# Patient Record
Sex: FEMALE | Race: WHITE | URBAN - METROPOLITAN AREA
[De-identification: names, ages, dates, MRNs, and addresses within clinical notes are randomized per-mention and may not be internally consistent; named-entity substitution may affect disease eponyms.]

---

## 2022-03-30 ENCOUNTER — OFFICE VISIT (OUTPATIENT)
Dept: GASTROENTEROLOGY | Age: 62
End: 2022-03-30
Payer: COMMERCIAL

## 2022-03-30 VITALS — WEIGHT: 166 LBS | BODY MASS INDEX: 26.68 KG/M2 | HEIGHT: 66 IN

## 2022-03-30 DIAGNOSIS — K21.9 GASTROESOPHAGEAL REFLUX DISEASE, UNSPECIFIED WHETHER ESOPHAGITIS PRESENT: Primary | ICD-10-CM

## 2022-03-30 PROCEDURE — 99204 OFFICE O/P NEW MOD 45 MIN: CPT | Performed by: SPECIALIST

## 2022-03-30 RX ORDER — OMEPRAZOLE 20 MG/1
20 TABLET, DELAYED RELEASE ORAL DAILY
COMMUNITY

## 2022-03-30 RX ORDER — GABAPENTIN 300 MG/1
300 CAPSULE ORAL DAILY
COMMUNITY

## 2022-03-30 ASSESSMENT — ENCOUNTER SYMPTOMS
RESPIRATORY NEGATIVE: 1
GASTROINTESTINAL NEGATIVE: 1
NAUSEA: 0
BLOOD IN STOOL: 0
ANAL BLEEDING: 0
RECTAL PAIN: 0
CONSTIPATION: 0
ABDOMINAL DISTENTION: 0
VOMITING: 0
DIARRHEA: 0
EYES NEGATIVE: 1
ABDOMINAL PAIN: 0

## 2022-03-30 NOTE — PROGRESS NOTES
Gastroenterology Clinic Visit    Antoni Lyn  [id number]  Chief Complaint   Patient presents with   Esperanza Rodas     Patient complains of acid reflux, bloating and trouble swallowing over the past few years. HPI: 62 y.o. female presents to the clinic with history of regurgitation especially when patient bends over and also has episodes of hiccups and sensation of epigastric fullness after a meal.  No emesis. Had these symptoms for the last 2 years. History of occasional retrosternal burning sensation. Has been on omeprazole 20 g once a day with improvement in symptoms. Patient also experiences sensation of food sitting in the throat. No history of hematemesis or melena. History of 5 pound voluntary weight loss. Takes meloxicam as needed for arthritis. Experience chest discomfort when patient lies flat and has to elevate the head to relieve symptoms. Social history does not smoke drinks alcohol very occasionally. Surgical history includes cardiac ablation, lithotripsy for kidney stones and shoulder surgery. Family history negative for bladder redness esophagus or esophageal neoplasm. Review of Systems   Constitutional: Negative. HENT: Negative. Eyes: Negative. Respiratory: Negative. Cardiovascular: Negative. History of SVT status post ablation therapy currently cardiac status is stable   Gastrointestinal: Negative. Negative for abdominal distention, abdominal pain, anal bleeding, blood in stool, constipation, diarrhea, nausea, rectal pain and vomiting. GERD and regurgitation,   Endocrine: Negative. Genitourinary: Negative. History of kidney stones   Musculoskeletal: Positive for arthralgias. Skin: Negative. Allergic/Immunologic: Negative for food allergies. Neurological: Negative. Neuropathy   Hematological: Negative. Psychiatric/Behavioral: Negative. No past medical history on file.    No past surgical history on file.  Current Outpatient Medications on File Prior to Visit   Medication Sig Dispense Refill    gabapentin (NEURONTIN) 300 MG capsule Take 300 capsules by mouth daily.  omeprazole (PRILOSEC OTC) 20 MG tablet Take 20 mg by mouth daily      sertraline (ZOLOFT) 50 MG tablet Take 50 mg by mouth daily       No current facility-administered medications on file prior to visit. No family history on file. Social History     Socioeconomic History    Marital status:      Spouse name: None    Number of children: None    Years of education: None    Highest education level: None   Occupational History    None   Tobacco Use    Smoking status: Never Smoker    Smokeless tobacco: Never Used   Substance and Sexual Activity    Alcohol use: Never    Drug use: Never    Sexual activity: None   Other Topics Concern    None   Social History Narrative    None     Social Determinants of Health     Financial Resource Strain:     Difficulty of Paying Living Expenses: Not on file   Food Insecurity:     Worried About Running Out of Food in the Last Year: Not on file    Rose of Food in the Last Year: Not on file   Transportation Needs:     Lack of Transportation (Medical): Not on file    Lack of Transportation (Non-Medical):  Not on file   Physical Activity:     Days of Exercise per Week: Not on file    Minutes of Exercise per Session: Not on file   Stress:     Feeling of Stress : Not on file   Social Connections:     Frequency of Communication with Friends and Family: Not on file    Frequency of Social Gatherings with Friends and Family: Not on file    Attends Congregation Services: Not on file    Active Member of Clubs or Organizations: Not on file    Attends Club or Organization Meetings: Not on file    Marital Status: Not on file   Intimate Partner Violence:     Fear of Current or Ex-Partner: Not on file    Emotionally Abused: Not on file    Physically Abused: Not on file    Sexually Abused: Not on file   Housing Stability:     Unable to Pay for Housing in the Last Year: Not on file    Number of Places Lived in the Last Year: Not on file    Unstable Housing in the Last Year: Not on file       Height 5' 6\" (1.676 m), weight 166 lb (75.3 kg). Physical Exam  Constitutional:       Appearance: She is well-developed. HENT:      Head: Normocephalic and atraumatic. Eyes:      Conjunctiva/sclera: Conjunctivae normal.      Pupils: Pupils are equal, round, and reactive to light. Cardiovascular:      Rate and Rhythm: Normal rate. Pulmonary:      Effort: Pulmonary effort is normal.   Abdominal:      General: Bowel sounds are normal.      Palpations: Abdomen is soft. Comments: Soft nontender no palpable mass   Musculoskeletal:         General: Normal range of motion. Cervical back: Normal range of motion. Skin:     General: Skin is warm. Neurological:      Mental Status: She is alert. Laboratory, Pathology, Radiology reviewed indetail with relevant important investigations summarized below:  No results found for: WBC, HGB, HCT, MCV, PLT  No results found for: ALT, AST, GGT, ALKPHOS, BILITOT    No results found. Endoscopic investigations:     Assessmentand Plan:  62 y.o. female with history of GERD, postprandial bloating and occasional supra esophageal symptoms. Has been on a PPI with some response. Possible gastroparesis. Will schedule EGD. Diagnosis Orders   1. Gastroesophageal reflux disease, unspecified whether esophagitis present  EGD     No follow-ups on file. Julieta Garcia MD   Staff Gastroenterologist  [facility]    Please note this report has been partially produced using speech recognition software and may cause contain errors related to thatsystem including grammar, punctuation and spelling as well as words and phrases that may seem inappropriate. If there are questions or concerns please feel free to contact me to clarify.

## 2022-05-03 LAB
SARS-COV-2, PCR: NOT DETECTED
SPECIMEN SOURCE: NORMAL

## 2022-05-13 LAB — SURGICAL PATHOLOGY REPORT: NORMAL

## 2022-05-13 RX ORDER — CLARITHROMYCIN 500 MG/1
500 TABLET, COATED ORAL 2 TIMES DAILY
Qty: 28 TABLET | Refills: 0 | Status: SHIPPED | OUTPATIENT
Start: 2022-05-13 | End: 2022-05-27

## 2022-05-13 RX ORDER — AMOXICILLIN 500 MG/1
1000 CAPSULE ORAL 2 TIMES DAILY
Qty: 56 CAPSULE | Refills: 0 | Status: SHIPPED | OUTPATIENT
Start: 2022-05-13 | End: 2022-05-27

## 2022-05-13 RX ORDER — PANTOPRAZOLE SODIUM 40 MG/1
40 TABLET, DELAYED RELEASE ORAL
Qty: 28 TABLET | Refills: 0 | Status: SHIPPED | OUTPATIENT
Start: 2022-05-13 | End: 2022-05-27

## 2023-03-29 DIAGNOSIS — F41.9 ANXIETY: ICD-10-CM

## 2023-03-29 RX ORDER — SERTRALINE HYDROCHLORIDE 50 MG/1
50 TABLET, FILM COATED ORAL DAILY
Qty: 90 TABLET | Refills: 3 | Status: SHIPPED | OUTPATIENT
Start: 2023-03-29

## 2023-03-29 RX ORDER — SERTRALINE HYDROCHLORIDE 50 MG/1
1 TABLET, FILM COATED ORAL DAILY
COMMUNITY
Start: 2019-04-18 | End: 2023-03-29 | Stop reason: SDUPTHER

## 2023-04-11 RX ORDER — CALCIUM CARBONATE/VITAMIN D3 500 MG-10
TABLET,CHEWABLE ORAL
COMMUNITY
End: 2023-12-12 | Stop reason: WASHOUT

## 2023-04-11 RX ORDER — GABAPENTIN 300 MG/1
1 CAPSULE ORAL DAILY
COMMUNITY

## 2023-04-11 RX ORDER — METHYLPREDNISOLONE 4 MG/1
TABLET ORAL
COMMUNITY
Start: 2022-10-24 | End: 2023-04-13 | Stop reason: ALTCHOICE

## 2023-04-11 RX ORDER — NALOXONE HYDROCHLORIDE 4 MG/.1ML
SPRAY NASAL
COMMUNITY
Start: 2023-01-02 | End: 2023-04-13 | Stop reason: ALTCHOICE

## 2023-04-11 RX ORDER — CHOLECALCIFEROL (VITAMIN D3) 25 MCG
1 TABLET ORAL DAILY
COMMUNITY

## 2023-04-11 RX ORDER — PANTOPRAZOLE SODIUM 40 MG/1
1 TABLET, DELAYED RELEASE ORAL DAILY
COMMUNITY
Start: 2022-05-13 | End: 2023-04-13 | Stop reason: ALTCHOICE

## 2023-04-11 RX ORDER — MELOXICAM 15 MG/1
1 TABLET ORAL DAILY PRN
COMMUNITY
Start: 2015-07-08

## 2023-04-11 RX ORDER — AZITHROMYCIN 250 MG/1
TABLET, FILM COATED ORAL
COMMUNITY
Start: 2023-03-31 | End: 2023-04-13 | Stop reason: ALTCHOICE

## 2023-04-11 RX ORDER — OMEPRAZOLE 20 MG/1
1 TABLET, DELAYED RELEASE ORAL DAILY
COMMUNITY

## 2023-04-11 RX ORDER — PSYLLIUM HUSK 0.4 G
1 CAPSULE ORAL DAILY
COMMUNITY

## 2023-04-11 RX ORDER — CLARITHROMYCIN 500 MG/1
TABLET, FILM COATED ORAL
COMMUNITY
Start: 2022-05-13 | End: 2023-04-13 | Stop reason: ALTCHOICE

## 2023-04-11 RX ORDER — ACYCLOVIR 400 MG/1
TABLET ORAL
COMMUNITY
End: 2023-04-13 | Stop reason: ALTCHOICE

## 2023-04-11 RX ORDER — AMOXICILLIN 500 MG/1
CAPSULE ORAL
COMMUNITY
Start: 2022-05-13 | End: 2023-04-13 | Stop reason: ALTCHOICE

## 2023-04-11 RX ORDER — ROSUVASTATIN CALCIUM 20 MG/1
1 TABLET, COATED ORAL DAILY
COMMUNITY
Start: 2022-06-02 | End: 2023-10-16 | Stop reason: SDUPTHER

## 2023-04-13 ENCOUNTER — OFFICE VISIT (OUTPATIENT)
Dept: PRIMARY CARE | Facility: CLINIC | Age: 63
End: 2023-04-13
Payer: COMMERCIAL

## 2023-04-13 VITALS
BODY MASS INDEX: 25.27 KG/M2 | WEIGHT: 161 LBS | SYSTOLIC BLOOD PRESSURE: 112 MMHG | DIASTOLIC BLOOD PRESSURE: 78 MMHG | HEIGHT: 67 IN

## 2023-04-13 DIAGNOSIS — E78.41 ELEVATED LIPOPROTEIN(A): Primary | ICD-10-CM

## 2023-04-13 PROBLEM — M19.90 CHRONIC OSTEOARTHRITIS: Status: ACTIVE | Noted: 2023-04-13

## 2023-04-13 PROBLEM — G62.9 POLYNEUROPATHY: Status: ACTIVE | Noted: 2023-04-13

## 2023-04-13 PROBLEM — F32.9 REACTIVE DEPRESSION: Status: ACTIVE | Noted: 2023-04-13

## 2023-04-13 PROBLEM — E78.2 MIXED HYPERLIPIDEMIA: Status: ACTIVE | Noted: 2023-04-13

## 2023-04-13 PROBLEM — R00.2 PALPITATIONS: Status: ACTIVE | Noted: 2023-04-13

## 2023-04-13 PROBLEM — L30.9 DERMATITIS: Status: ACTIVE | Noted: 2023-04-13

## 2023-04-13 PROBLEM — K21.9 GASTROESOPHAGEAL REFLUX DISEASE WITHOUT ESOPHAGITIS: Status: ACTIVE | Noted: 2023-04-13

## 2023-04-13 PROCEDURE — 99213 OFFICE O/P EST LOW 20 MIN: CPT | Performed by: FAMILY MEDICINE

## 2023-04-13 ASSESSMENT — ENCOUNTER SYMPTOMS
PSYCHIATRIC NEGATIVE: 1
CONSTITUTIONAL NEGATIVE: 1
RESPIRATORY NEGATIVE: 1
ARTHRALGIAS: 1
ENDOCRINE NEGATIVE: 1
NUMBNESS: 1
GASTROINTESTINAL NEGATIVE: 1
HEMATOLOGIC/LYMPHATIC NEGATIVE: 1
ALLERGIC/IMMUNOLOGIC NEGATIVE: 1
CARDIOVASCULAR NEGATIVE: 1

## 2023-04-13 NOTE — PATIENT INSTRUCTIONS
Contd meds , diet , daily X's ,labs reviewed, FUWOD's oph / gyn/neuro, refuses Fosamax, mamogram pending , , tcb x 2wks ,

## 2023-04-13 NOTE — PROGRESS NOTES
"Subjective   Patient ID: Salina Gordon is a 62 y.o. female who presents for Annual Exam (Annual Physical Exam).    ROV         Review of Systems   Constitutional: Negative.    HENT: Negative.     Eyes:  Positive for visual disturbance.   Respiratory: Negative.     Cardiovascular: Negative.    Gastrointestinal: Negative.    Endocrine: Negative.    Genitourinary: Negative.    Musculoskeletal:  Positive for arthralgias.   Skin:  Positive for rash (ch).   Allergic/Immunologic: Negative.    Neurological:  Positive for numbness.   Hematological: Negative.    Psychiatric/Behavioral: Negative.         Objective   /78   Ht 1.702 m (5' 7\")   Wt 73 kg (161 lb)   BMI 25.22 kg/m²     Physical Exam  Constitutional:       Appearance: Normal appearance.   HENT:      Head: Normocephalic and atraumatic.      Nose: Nose normal.      Mouth/Throat:      Mouth: Mucous membranes are moist.   Eyes:      Pupils: Pupils are equal, round, and reactive to light.   Cardiovascular:      Rate and Rhythm: Normal rate and regular rhythm.   Pulmonary:      Effort: Pulmonary effort is normal.   Abdominal:      Palpations: Abdomen is soft.   Musculoskeletal:         General: Normal range of motion.      Cervical back: Normal range of motion.   Skin:     General: Skin is warm.   Neurological:      General: No focal deficit present.      Mental Status: She is alert and oriented to person, place, and time.   Psychiatric:         Mood and Affect: Mood normal.         Behavior: Behavior normal.         Assessment/Plan          "

## 2023-09-12 ENCOUNTER — APPOINTMENT (OUTPATIENT)
Dept: PRIMARY CARE | Facility: CLINIC | Age: 63
End: 2023-09-12
Payer: COMMERCIAL

## 2023-10-04 PROBLEM — M35.01 KERATITIS SICCA, BILATERAL (MULTI): Status: ACTIVE | Noted: 2023-10-04

## 2023-10-04 PROBLEM — R00.1 SINUS BRADYCARDIA: Status: ACTIVE | Noted: 2023-10-04

## 2023-10-04 PROBLEM — S82.143A TIBIAL PLATEAU FRACTURE: Status: ACTIVE | Noted: 2023-10-04

## 2023-10-04 PROBLEM — N13.30 HYDRONEPHROSIS: Status: ACTIVE | Noted: 2023-10-04

## 2023-10-04 PROBLEM — Z95.818 STATUS POST PLACEMENT OF IMPLANTABLE LOOP RECORDER: Status: ACTIVE | Noted: 2023-10-04

## 2023-10-04 PROBLEM — H25.13 NUCLEAR SCLEROTIC CATARACT OF BOTH EYES: Status: ACTIVE | Noted: 2023-10-04

## 2023-10-04 PROBLEM — R06.89 DIFFICULTY BREATHING: Status: ACTIVE | Noted: 2023-10-04

## 2023-10-04 PROBLEM — S82.009A PATELLA FRACTURE: Status: ACTIVE | Noted: 2023-10-04

## 2023-10-04 PROBLEM — H16.223 KERATITIS SICCA, BILATERAL: Status: ACTIVE | Noted: 2023-10-04

## 2023-10-04 PROBLEM — I49.3 PREMATURE VENTRICULAR CONTRACTIONS: Status: ACTIVE | Noted: 2023-10-04

## 2023-10-04 PROBLEM — R79.89 LOW TSH LEVEL: Status: ACTIVE | Noted: 2023-10-04

## 2023-10-04 PROBLEM — R06.00 DYSPNEA: Status: ACTIVE | Noted: 2023-10-04

## 2023-10-04 PROBLEM — M75.81 TENDINITIS OF RIGHT ROTATOR CUFF: Status: ACTIVE | Noted: 2023-10-04

## 2023-10-04 PROBLEM — N28.1 RENAL CYST: Status: ACTIVE | Noted: 2023-10-04

## 2023-10-04 PROBLEM — K21.9 REFLUX LARYNGITIS: Status: ACTIVE | Noted: 2023-10-04

## 2023-10-04 PROBLEM — J04.0 REFLUX LARYNGITIS: Status: ACTIVE | Noted: 2023-10-04

## 2023-10-04 PROBLEM — G56.00 CARPAL TUNNEL SYNDROME: Status: ACTIVE | Noted: 2023-10-04

## 2023-10-04 PROBLEM — I47.10 PAROXYSMAL SUPRAVENTRICULAR TACHYCARDIA (CMS-HCC): Status: ACTIVE | Noted: 2023-10-04

## 2023-10-04 PROBLEM — F41.9 ANXIETY: Status: ACTIVE | Noted: 2023-10-04

## 2023-10-04 PROBLEM — M75.100 RCT (ROTATOR CUFF TEAR): Status: ACTIVE | Noted: 2023-10-04

## 2023-10-04 PROBLEM — R55 SYNCOPE: Status: ACTIVE | Noted: 2023-10-04

## 2023-10-04 PROBLEM — N20.0 NEPHROLITHIASIS: Status: ACTIVE | Noted: 2023-10-04

## 2023-10-04 PROBLEM — M70.70 HIP BURSITIS: Status: ACTIVE | Noted: 2023-10-04

## 2023-10-04 PROBLEM — M75.110 PARTIAL TEAR OF ROTATOR CUFF: Status: ACTIVE | Noted: 2023-10-04

## 2023-10-04 PROBLEM — H18.413 ARCUS SENILIS, BILATERAL: Status: ACTIVE | Noted: 2023-10-04

## 2023-10-04 PROBLEM — M85.80 OSTEOPENIA: Status: ACTIVE | Noted: 2023-10-04

## 2023-10-04 RX ORDER — PANTOPRAZOLE SODIUM 40 MG/1
1 TABLET, DELAYED RELEASE ORAL DAILY
COMMUNITY
Start: 2022-05-13 | End: 2023-12-12 | Stop reason: WASHOUT

## 2023-10-04 RX ORDER — ACYCLOVIR 400 MG/1
400 TABLET ORAL 3 TIMES DAILY PRN
COMMUNITY
End: 2024-01-29 | Stop reason: SDUPTHER

## 2023-10-05 ENCOUNTER — TREATMENT (OUTPATIENT)
Dept: PHYSICAL THERAPY | Facility: CLINIC | Age: 63
End: 2023-10-05
Payer: COMMERCIAL

## 2023-10-05 DIAGNOSIS — M25.512 CHRONIC LEFT SHOULDER PAIN: Primary | ICD-10-CM

## 2023-10-05 DIAGNOSIS — G89.29 CHRONIC LEFT SHOULDER PAIN: Primary | ICD-10-CM

## 2023-10-05 PROCEDURE — 97110 THERAPEUTIC EXERCISES: CPT | Mod: GP

## 2023-10-05 PROCEDURE — 97140 MANUAL THERAPY 1/> REGIONS: CPT | Mod: GP

## 2023-10-05 ASSESSMENT — PAIN - FUNCTIONAL ASSESSMENT: PAIN_FUNCTIONAL_ASSESSMENT: 0-10

## 2023-10-05 ASSESSMENT — PAIN SCALES - GENERAL: PAINLEVEL_OUTOF10: 0 - NO PAIN

## 2023-10-05 ASSESSMENT — ENCOUNTER SYMPTOMS
LOSS OF SENSATION IN FEET: 0
OCCASIONAL FEELINGS OF UNSTEADINESS: 1
DEPRESSION: 0

## 2023-10-05 NOTE — PROGRESS NOTES
Physical Therapy Treatment    Patient Name: Salina Gordon  MRN: [id number]  Today's Date: 10/5/2023  Time Calculation  Start Time: 0501  Stop Time: 0542  Time Calculation (min): 41 min      Current Problem  1. Chronic left shoulder pain              Subjective     Insurance  Insurance reviewed   Visit number: 2   Approved number of visits: 30   Authorization not required after evaluation   [facility]  Copay $25       Fall Risk:   High      General   Pt states she feels her mobility has increased since the initial evaluation. She states she doesn't necessarily have pain but her shoulder feels very tight.    Precautions     Vital Signs       Pain  Pain Assessment: 0-10  Pain Score: 0 - No pain    Ortho  Objective           Treatments:    UBE 3/3 Level 1.2  Rows RTB 2x10  LAE RTB 2x10  IR/ER YTB 2x10  Supine wand FF x15  S/L 4-way shoulder 1# 2x10  UT stretch 3x30s  IR towel stretch 10x10s  Wall slides FF x10      PROM   Inf Mobs for Abd     OP EDUCATION:   Access Code: [email or web address]  URL: [email or web address]  Date: 10/05/2023  Prepared by: Radha Marcelino    Exercises  - Seated Upper Trapezius Stretch  - 1 x daily - 7 x weekly - 3 sets - 30s hold  - Shoulder Flexion Wall Slide with Towel  - 1 x daily - 7 x weekly - 10 reps    Assessment:   Pt able to tolerate treatment session today. Able to introduce GH and scapular strengthening exercises with no increased pain. Some instances of a catching sensation but dec by added weight with sidelying exercises. Pt had difficulty with IR stretch, but able to complete. Introduced UT stretch d/t complaints of tightness in neck. Verbal cues for proper exercise form and postural awareness. Pt will continue to benefit from skilled therapy in order to reach her goals.    Plan:

## 2023-10-12 ENCOUNTER — TREATMENT (OUTPATIENT)
Dept: PHYSICAL THERAPY | Facility: CLINIC | Age: 63
End: 2023-10-12
Payer: COMMERCIAL

## 2023-10-12 DIAGNOSIS — M25.511 PAIN IN JOINT OF RIGHT SHOULDER: Primary | ICD-10-CM

## 2023-10-12 PROCEDURE — 97110 THERAPEUTIC EXERCISES: CPT | Mod: GP,CQ

## 2023-10-12 PROCEDURE — 97140 MANUAL THERAPY 1/> REGIONS: CPT | Mod: GP,CQ

## 2023-10-12 ASSESSMENT — PAIN - FUNCTIONAL ASSESSMENT: PAIN_FUNCTIONAL_ASSESSMENT: 0-10

## 2023-10-12 ASSESSMENT — PAIN SCALES - GENERAL: PAINLEVEL_OUTOF10: 0 - NO PAIN

## 2023-10-12 NOTE — PROGRESS NOTES
"Physical Therapy Treatment    Patient Name: Salina Gordon  MRN: [id number]  Today's Date: 10/12/2023  Time Calculation  Start Time: 0245  Insurance:   Visit number: 3   Approved number of visits: 30   Authorization not required after evaluation   [facility]  Copay $25     Assessment:  PT Assessment  Rehab Prognosis: Good Crepitus and tightness noted initially w/ PROM to R shoulder this visit. Performed jt mobilization prior to ROM which decreased tightness and popping tremendously. She was able to barron Manual today w/o c/o increased pain. Cues w/ ex's for pacing and holds w/ great f/t. Pt has good barron to Tband ex's w/ tactile and verbal cues to decrease UT elevation as she fatigues.  Pt will cont to benefit from skilled PT to address her deficits and improve her overall functional ability.     Plan:   Cont to progress program as barron w/ emphasis on improving R shoulder mobility and strength.     Current Problem  1. Pain in joint of right shoulder              Subjective   General   Pt states most of her discomfort comes \"at night\" when she tries to sleep as she is a R sided sleeper. \"I do really feel like it's better.\"   Precautions  Precautions  Precautions Comment: NONE    Pain  Pain Assessment: 0-10  Pain Score: 0 - No pain    Objective     Extremity/Trunk Assessment    Outcome Measures:      Treatments:  Therapeutic Exercise (66011):   UBE 3/3 Level 1.2  Rows RTB 2x10  LAE RTB 2x10  IR/ER YTB x20  Supine wand FF x15  S/L 4-way shoulder 1# x15  UT stretch 3x30s NT  IR towel stretch 10x10s  Wall slides FF x10         Manual (56099): Inf Mobs for Abd  /PROM (R): 10 min     OP EDUCATION:       Goals:     "

## 2023-10-16 DIAGNOSIS — E78.2 MIXED HYPERLIPIDEMIA: ICD-10-CM

## 2023-10-16 RX ORDER — ROSUVASTATIN CALCIUM 20 MG/1
20 TABLET, COATED ORAL DAILY
Qty: 90 TABLET | Refills: 3 | Status: SHIPPED | OUTPATIENT
Start: 2023-10-16 | End: 2024-10-15

## 2023-10-17 ENCOUNTER — PHARMACY VISIT (OUTPATIENT)
Dept: PHARMACY | Facility: CLINIC | Age: 63
End: 2023-10-17
Payer: COMMERCIAL

## 2023-10-17 PROCEDURE — RXMED WILLOW AMBULATORY MEDICATION CHARGE

## 2023-10-19 ENCOUNTER — TREATMENT (OUTPATIENT)
Dept: PHYSICAL THERAPY | Facility: CLINIC | Age: 63
End: 2023-10-19
Payer: COMMERCIAL

## 2023-10-19 DIAGNOSIS — M25.511 PAIN IN JOINT OF RIGHT SHOULDER: Primary | ICD-10-CM

## 2023-10-19 PROCEDURE — 97110 THERAPEUTIC EXERCISES: CPT | Mod: GP

## 2023-10-19 NOTE — PROGRESS NOTES
Physical Therapy Treatment    Patient Name: Salina Gordon  MRN: [id number]  Today's Date: 10/19/2023  Time Calculation  Start Time: 1058  Stop Time: 1135  Time Calculation (min): 37 min  Insurance:   Visit number: 4  Approved number of visits: 30   Authorization not required after evaluation   [facility]  Copay $25     Assessment:    Pt tolerated treatment session well today. Able to progress several exercises with appropriate fatigue by end of session. Pt able to demonstrate improved PROM and AROM during session today. Verbal cues for postural awareness. Pt will continue to benefit from skilled therapy in order to reach her goals.    Plan:   Cont to progress program as barron w/ emphasis on improving R shoulder mobility and strength.     Current Problem  1. Pain in joint of right shoulder                Subjective   General   Pt states she continues to have to stretch her shoulder or it locks up on her. Main discomfort is at night.  Precautions       Pain       Objective     Extremity/Trunk Assessment    Outcome Measures:      Treatments:  Therapeutic Exercise (64538):   UBE 3/3 Level 1.2  ^Rows GTB 2x10  ^LAE GTB 2x10  ^IR/ER RTB x20  Supine wand FF x15NT  S/L 4-way shoulder 1# x15  UT stretch 3x30s NT  IR towel stretch 10x10s  Wall slides FF and scaption x10    Wall clocks YTB x10  Bent over shoulder horiz abdx10     Manual (46464): Inf Mobs for Abd  /PROM (R): 10 min     OP EDUCATION:   Access Code: [email or web address]  URL: [email or web address]  Date: 10/19/2023  Prepared by: Radha Marcelino    Exercises  - Standing Shoulder Internal Rotation Stretch with Towel  - 1 x daily - 7 x weekly - 1 sets - 10 reps - 10s hold    Goals:

## 2023-10-25 NOTE — PROGRESS NOTES
"Physical Therapy Treatment    Patient Name: Salina Gordon  MRN: [id number]  Today's Date: 10/26/2023  Time Calculation  Start Time: 0746  Stop Time: 0830  Time Calculation (min): 44 min  Insurance:   Visit number: 5  Approved number of visits: 30   Authorization not required after evaluation   [facility]  Copay $25  Assessment:  PT Assessment  Rehab Prognosis: Good Improved ROM and almost full in Flex, Scaption/Abd and ER. She is still limited w/ IR however it has also improved w/ skilled PT program. Pt able to barron increase in reps w/ some ex's performed - appropriate fatigue observed (slight increased UT elevation). Added lifts to wall slides, in both flex and scaption w/ good tolerance to. Improving scapulohumeral rhythm observed. Pt is progressing very nicely towards her goals.     Plan:   Re-check em SMITH, 11/2/23.     Current Problem  1. Pain in joint of right shoulder        2. Chronic left shoulder pain            Subjective   General   Pt states she cont's to notice Improvements in the R shoulder. She still has some difficulties \"reaching in to her back pocket.\"   Precautions  Precautions  Precautions Comment: NONE    Pain  Pain Assessment: 0-10  Pain Score: 0 - No pain    Objective       Treatments:  Therapeutic Exercise (20433):    UBE 3/3 Level 1.2   ^Rows GTB 2x10  ^LAE GTB 2x10  ^IR/ER RTB x20  Supine wand FF x15NT  S/L 4-way shoulder 1# 2x15  UT stretch 3x30s NT  IR towel stretch 10x10s  Wall slides FF and scaption x10 (w/ lift)   Wall clocks YTB x10   Bent over shoulder horiz abd 2x10      Manual (97036): Inf Mobs for Abd  /PROM (R): 10 min         EDUCATION:         "

## 2023-10-26 ENCOUNTER — TREATMENT (OUTPATIENT)
Dept: PHYSICAL THERAPY | Facility: CLINIC | Age: 63
End: 2023-10-26
Payer: COMMERCIAL

## 2023-10-26 ENCOUNTER — HOSPITAL ENCOUNTER (OUTPATIENT)
Dept: CARDIOLOGY | Facility: HOSPITAL | Age: 63
Discharge: HOME | End: 2023-10-26
Payer: COMMERCIAL

## 2023-10-26 DIAGNOSIS — R55 SYNCOPE AND COLLAPSE: ICD-10-CM

## 2023-10-26 DIAGNOSIS — Z95.818 PRESENCE OF OTHER CARDIAC IMPLANTS AND GRAFTS: ICD-10-CM

## 2023-10-26 DIAGNOSIS — M25.511 PAIN IN JOINT OF RIGHT SHOULDER: Primary | ICD-10-CM

## 2023-10-26 DIAGNOSIS — M25.512 CHRONIC LEFT SHOULDER PAIN: ICD-10-CM

## 2023-10-26 DIAGNOSIS — G89.29 CHRONIC LEFT SHOULDER PAIN: ICD-10-CM

## 2023-10-26 DIAGNOSIS — Z95.818 PRESENCE OF OTHER CARDIAC IMPLANTS AND GRAFTS: Primary | ICD-10-CM

## 2023-10-26 PROCEDURE — 97140 MANUAL THERAPY 1/> REGIONS: CPT | Mod: GP,CQ

## 2023-10-26 PROCEDURE — 97110 THERAPEUTIC EXERCISES: CPT | Mod: GP,CQ

## 2023-10-26 PROCEDURE — 93298 REM INTERROG DEV EVAL SCRMS: CPT | Performed by: INTERNAL MEDICINE

## 2023-10-26 ASSESSMENT — PAIN - FUNCTIONAL ASSESSMENT: PAIN_FUNCTIONAL_ASSESSMENT: 0-10

## 2023-10-26 ASSESSMENT — PAIN SCALES - GENERAL: PAINLEVEL_OUTOF10: 0 - NO PAIN

## 2023-11-02 ENCOUNTER — APPOINTMENT (OUTPATIENT)
Dept: PHYSICAL THERAPY | Facility: CLINIC | Age: 63
End: 2023-11-02
Payer: COMMERCIAL

## 2023-11-02 ENCOUNTER — APPOINTMENT (OUTPATIENT)
Dept: PRIMARY CARE | Facility: CLINIC | Age: 63
End: 2023-11-02
Payer: COMMERCIAL

## 2023-11-02 NOTE — PROGRESS NOTES
"Physical Therapy Treatment    Patient Name: Salina Gordon  MRN: [id number]  Today's Date: 11/2/2023     Insurance:   Visit number: 5  Approved number of visits: 30   Authorization not required after evaluation   [facility]  Copay $25  Assessment:    Improved ROM and almost full in Flex, Scaption/Abd and ER. She is still limited w/ IR however it has also improved w/ skilled PT program. Pt able to barron increase in reps w/ some ex's performed - appropriate fatigue observed (slight increased UT elevation). Added lifts to wall slides, in both flex and scaption w/ good tolerance to. Improving scapulohumeral rhythm observed. Pt is progressing very nicely towards her goals.     Plan:   Re-check sched NV, 11/2/23.     Current Problem  No diagnosis found.      Subjective   General   Pt states she cont's to notice Improvements in the R shoulder. She still has some difficulties \"reaching in to her back pocket.\"   Precautions       Pain       Objective       Treatments:  Therapeutic Exercise (89664):    UBE 3/3 Level 1.2   ^Rows GTB 2x10  ^LAE GTB 2x10  ^IR/ER RTB x20  Supine wand FF x15NT  S/L 4-way shoulder 1# 2x15  UT stretch 3x30s NT  IR towel stretch 10x10s  Wall slides FF and scaption x10 (w/ lift)   Wall clocks YTB x10   Bent over shoulder horiz abd 2x10      Manual (35116): Inf Mobs for Abd  /PROM (R): 10 min         EDUCATION:   Goals  Pt will be independent with HEP  Pt will demonstrate increased global shoulder strength MMT to 4+/5 in order to improve functional mobility  Pt will demonstrate increased shoulder flexion ROM to 0-180 without pain in order to perform work duties  Pt will demonstrate increased shoulder abduction ROM to 0-165 in order to perform ADLs  Pt will demonstrate increased shoulder ER/IR ROM to WNL in order to perform self care tasks.  Pt will report decrease in pain to 0-1/10 in order to improve functional mobility  Pt will report a decrease on the Quick DASH by 11 points (MDC) in order to " return to ADLs  - Baseline score ___ on

## 2023-11-03 ENCOUNTER — HOSPITAL ENCOUNTER (OUTPATIENT)
Dept: CARDIOLOGY | Facility: HOSPITAL | Age: 63
Discharge: HOME | End: 2023-11-03
Payer: COMMERCIAL

## 2023-11-03 DIAGNOSIS — Z95.818 PRESENCE OF OTHER CARDIAC IMPLANTS AND GRAFTS: ICD-10-CM

## 2023-11-03 DIAGNOSIS — R55 SYNCOPE AND COLLAPSE: ICD-10-CM

## 2023-11-06 ENCOUNTER — OFFICE VISIT (OUTPATIENT)
Dept: ORTHOPEDIC SURGERY | Facility: CLINIC | Age: 63
End: 2023-11-06
Payer: COMMERCIAL

## 2023-11-06 VITALS — BODY MASS INDEX: 26.68 KG/M2 | HEIGHT: 67 IN | WEIGHT: 170 LBS

## 2023-11-06 DIAGNOSIS — M17.11 PATELLOFEMORAL ARTHRITIS OF RIGHT KNEE: ICD-10-CM

## 2023-11-06 PROCEDURE — 99214 OFFICE O/P EST MOD 30 MIN: CPT | Performed by: ORTHOPAEDIC SURGERY

## 2023-11-06 PROCEDURE — 1036F TOBACCO NON-USER: CPT | Performed by: ORTHOPAEDIC SURGERY

## 2023-11-06 NOTE — PROGRESS NOTES
History of present illness: Patient here eval right shoulder right knee    Right shoulder had cuff bursitis tendinitis PT therapy rehab program oral steroids with 100% response    Right knee still giving her some patellofemoral irritability she is here for evaluation of both the right knee and shoulder    Physical exam:    General: No acute distress or breathing difficulty or discomfort, pleasant and cooperative with the examination.    Extremities: Right shoulder exam    Patient can flex up to 160 abduct to 70 good push pull    Patient can internally rotate to L4    Motor intact C5-T1    No night pain no impingement no gross instability good strength and function returning    Right knee exam    The affected right knee was examined and inspected and was tender to touch along the medial and lateral aspect with catching, locking or mechanical symptoms.    The skin was intact without breakdown or open wound.  Old incisions of present were healed.    There was a mild Lexy exam seen with some evidence of instability and weakness in the collateral ligaments with varus valgus stressing and laxity in the anterior or posterior planes.    There was a negative Lachman's test pivot shift test and posterior drawer sign with no foot drop, numbness or tingling.    Sensation, reflexes and pulses in the foot and ankle are preserved.  There was an effusion.  Range of motion showed good straight leg raise with flexion to 150 degrees and extension to 0 degrees.  The patient had the ability to bear weight but with discomfort.  The patient's gait was antalgic secondary to discomfort    Diagnostic studies: MRI of the knee shows chondromalacia patella right side    MRI of the shoulder showed cuff bursitis        Impression: Right knee chondromalacia    Right shoulder cuff bursitis    Plan: Continue therapy rehab program she had a great response of her shoulder she is adding some therapy rehab to her knee were happy to provide that  prescription we will see her back if there is an issue or problem

## 2023-11-09 ENCOUNTER — TREATMENT (OUTPATIENT)
Dept: PHYSICAL THERAPY | Facility: CLINIC | Age: 63
End: 2023-11-09
Payer: COMMERCIAL

## 2023-11-09 DIAGNOSIS — M25.511 PAIN IN JOINT OF RIGHT SHOULDER: Primary | ICD-10-CM

## 2023-11-09 PROCEDURE — 97110 THERAPEUTIC EXERCISES: CPT | Mod: GP

## 2023-11-09 NOTE — PROGRESS NOTES
Physical Therapy Treatment    Patient Name: Salina Gordon  MRN: [id number]  Today's Date: 11/9/2023  Time Calculation  Start Time: 0725  Stop Time: 0738  Time Calculation (min): 13 min  Insurance:   Visit number: 6  Approved number of visits: 30   Authorization not required after evaluation   [facility]  Copay $25  Assessment:    Pt has significantly improved her ROM and strength of her R shoulder. Her pain has also significantly improved. She is now able to return to functional activities without pain such as working. Pt has proper knowledge and understanding of HEP and will be discharged today with most updated version. Pt encouraged to call with any questions.    Plan:    Pt to be discharged with HEP. Pt encouraged to call with any questions or concerns.    Current Problem  1. Pain in joint of right shoulder              Subjective   General   Pt comes in today and states she has no pain is feeling like she turned the corner. She saw Dr. Pop on Monday who is happy with how she is doing. She would like to be discharged today.  Precautions       Pain   0/10    Objective   R Shoulder  Flexion 0-180  Abduction 0-155  ER 0-87  IR L4    MMT  Shoulder flexion 5/5  Shoulder abd 4+/5  Shoulder ER 4+/5  Shoulder IR 4+/5    Quick DASH 13/55    Treatments:    Therapeutic Exercise (80351):    UBE 3/3 Level 1.2   Goal Review  Review of HEP    NT  ^Rows GTB 2x10  ^LAE GTB 2x10  ^IR/ER RTB x20  Supine wand FF x15NT  S/L 4-way shoulder 1# 2x15  UT stretch 3x30s NT  IR towel stretch 10x10s  Wall slides FF and scaption x10 (w/ lift)   Wall clocks YTB x10   Bent over shoulder horiz abd 2x10      Manual (73836): Inf Mobs for Abd  /PROM (R): 10 min NT        EDUCATION:   Goals  Pt will be independent with HEP MET  Pt will demonstrate increased global shoulder strength MMT to 4+/5 in order to improve functional mobilityMET  Pt will demonstrate increased shoulder flexion ROM to 0-180 without pain in order to perform work  dutiesMET  Pt will demonstrate increased shoulder abduction ROM to 0-165 in order to perform ADLsPartially met  Pt will demonstrate increased shoulder ER/IR ROM to WNL in order to perform self care tasks. Partially met  Pt will report decrease in pain to 0-1/10 in order to improve functional mobilityMet  Pt will report a decrease on the Quick DASH by 11 points (MDC) in order to return to ADLs MEt  -

## 2023-11-14 ENCOUNTER — PHARMACY VISIT (OUTPATIENT)
Dept: PHARMACY | Facility: CLINIC | Age: 63
End: 2023-11-14
Payer: COMMERCIAL

## 2023-11-14 PROCEDURE — RXMED WILLOW AMBULATORY MEDICATION CHARGE

## 2023-11-22 ENCOUNTER — HOSPITAL ENCOUNTER (OUTPATIENT)
Dept: CARDIOLOGY | Facility: HOSPITAL | Age: 63
Discharge: HOME | End: 2023-11-22
Payer: COMMERCIAL

## 2023-11-22 DIAGNOSIS — R55 SYNCOPE AND COLLAPSE: ICD-10-CM

## 2023-11-22 DIAGNOSIS — Z95.818 PRESENCE OF OTHER CARDIAC IMPLANTS AND GRAFTS: ICD-10-CM

## 2023-11-30 ENCOUNTER — EVALUATION (OUTPATIENT)
Dept: PHYSICAL THERAPY | Facility: CLINIC | Age: 63
End: 2023-11-30
Payer: COMMERCIAL

## 2023-11-30 DIAGNOSIS — M17.11 PATELLOFEMORAL ARTHRITIS OF RIGHT KNEE: ICD-10-CM

## 2023-11-30 PROCEDURE — 97110 THERAPEUTIC EXERCISES: CPT | Mod: GP

## 2023-11-30 PROCEDURE — 97161 PT EVAL LOW COMPLEX 20 MIN: CPT | Mod: GP

## 2023-11-30 ASSESSMENT — PATIENT HEALTH QUESTIONNAIRE - PHQ9
SUM OF ALL RESPONSES TO PHQ9 QUESTIONS 1 AND 2: 0
1. LITTLE INTEREST OR PLEASURE IN DOING THINGS: NOT AT ALL
2. FEELING DOWN, DEPRESSED OR HOPELESS: NOT AT ALL

## 2023-11-30 ASSESSMENT — ENCOUNTER SYMPTOMS
LOSS OF SENSATION IN FEET: 1
DEPRESSION: 0
OCCASIONAL FEELINGS OF UNSTEADINESS: 0

## 2023-11-30 NOTE — PROGRESS NOTES
Physical Therapy Evaluation    Patient Name: Salina Gordon  MRN: [id number]    Current Problem  1. Patellofemoral arthritis of right knee  Referral to Physical Therapy        Insurance    Insurance reviewed   Visit number: 1  Approved number of visits: 30 (24 remain)  **insurance name Keven ROOLN Traditional  **visits/yr or copay $25.00  **No Auth Req      Subjective   General:  Pt comes in with R knee pain. She states this started 30 years ago and has been an ongoing issue. She states she had a fall 2 months ago which caused her knee to swell up. She states her main complaint is that she cannot get up off the floor easily at this time. She states she rarely has knee pain, it is mostly just overall weakness, instability, and difficulty doing her normal activities. Hx of neuropathy, osteoporosis. Pt comes in today for guidance and HEP to get stronger.  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  There is subchondral degenerative bone marrow signal at this site.  Prepatellar bursitis.  No sign of meniscal tear.  No acute ligament disruption.  Objective       PALPATION: No TTP  GAIT: trendelenburg gait  OBSERVATION: no swelling noted, normal tracking of patella with flexion/extension         Special Tests        Right Valgus Test 0 degrees: -  Left Valgus Test 0 degrees: -  Right Valgus Test 30 degrees: -  Left Valgus Test 30 degrees: -  Right Varus Test 0 degrees: -  Left Varus Test 0 degrees: -  Right Varus Test 30 degrees: -  Left Varus Test 30 degrees: -  Patient with History of Catching or Locking:  Right -  Left -  Pain with Forced Hyperextension: Right -  Left -  Pain with Maximum Flexion: Right -  Left  -  Lexy: Right -  Left -  Joint Line Tenderness: Right -  Left -                       ROM  Right knee flexion: 0-150   Left knee flexion: 0-150  Right knee extension: 0    Left knee extension: 0            MMT  Right quadriceps: 4    Left quadriceps: 4+  Right iliopsoas: 4+    Left iliopsoas: 5  Right gluteus medius: 4-    Left gluteus medius: 4-  Right hip adductors: 3+    Left hip adductors: 4  Right gluteus mookie: 4   Left gluteus mookie: 4  Right hamstrin   Left hamstrin            Flexibility  Right hamstring: WNL    Left hamstring: WNL  Right gastrocnemius: WNL   Left gastrocnemius: WNL  Right quadriceps: WNL   Left quadriceps: WNL  Right iliopsoas: WNL   Left iliopsoas: WNL    Outcome Measures:  LEFS 68/80 by PT Proxy    Treatment: See HEP below    EDUCATION/HEP:  Access Code: [email or web address]  URL: [email or web address]  Date: 2023  Prepared by: Radha Marcelino    Exercises  - Supine Bridge  - 1 x daily - 3-4 x weekly - 2 sets - 10 reps  - Active Straight Leg Raise with Quad Set  - 1 x daily - 3-4 x weekly - 2 sets - 10 reps  - Modified Sidelying Hip Abduction  - 1 x daily - 3-4 x weekly - 2 sets - 10 reps  - Clamshell with Resistance  - 1 x daily - 3-4 x weekly - 2 sets - 10 reps  - Sit to Stand Without Arm Support  - 1 x daily - 3-4 x weekly - 2 sets - 10 reps  - Wall Quarter Squat with Swiss Ball  - 1 x daily - 3-4 x weekly - 2 sets - 10 reps  - Forward Step Up with Counter Support  - 1 x daily - 3-4 x weekly - 2 sets - 10 reps  - Lateral Step Up with Counter Support  - 1 x daily - 3-4 x weekly - 2 sets - 10 reps  - Standing Hip Extension with Counter Support  - 1 x daily - 3-4 x weekly - 2 sets - 10 reps  Assessment:  62 y/o pt who presents with R LE weakness, dec functional mobility, and abnormal gait mechanics. Functional limitations include getting up off the floor and stairs. Pt would like to proceed with HEP at this time and demonstrates knowledge  and understanding of exercises at this time. Pt would benefit from skilled therapy if wanted in order to improve weakness, functional mobility and gait mechanics.    Clinical Presentation: Stable    Level of Complexity: Low     Goals:  Pt will be independent with HEP  Pt will demonstrate an increase of 9 points on the LEFS (MDC) in order to improve functional mobility  Pt will demonstrate an increase in global hip strength to 5/5 in order to improve functional mobility  Pt will demonstrate an increase in global knee strength to 5/5 in order to improve gait mechanics  Pt will be able to ascend/descend stairs in a reciprocal pattern in order to return to ADLs      Plan  OP PT Plan  Treatment/Interventions: Education/ Instruction  PT Plan: Initial Assessment and Treatment only  PT Frequency: One time visit  Onset Date: 11/30/23  Certification Period Start Date: 11/30/23  Certification Period End Date: 12/30/23  Rehab Potential: Good  Plan of Care Agreement: Patient

## 2023-12-12 ENCOUNTER — HOSPITAL ENCOUNTER (OUTPATIENT)
Dept: CARDIOLOGY | Facility: HOSPITAL | Age: 63
Discharge: HOME | End: 2023-12-12
Payer: COMMERCIAL

## 2023-12-12 ENCOUNTER — OFFICE VISIT (OUTPATIENT)
Dept: CARDIOLOGY | Facility: CLINIC | Age: 63
End: 2023-12-12
Payer: COMMERCIAL

## 2023-12-12 VITALS
BODY MASS INDEX: 27.47 KG/M2 | WEIGHT: 175 LBS | HEIGHT: 67 IN | HEART RATE: 64 BPM | SYSTOLIC BLOOD PRESSURE: 118 MMHG | DIASTOLIC BLOOD PRESSURE: 70 MMHG

## 2023-12-12 DIAGNOSIS — Z71.89 ENCOUNTER TO DISCUSS TREATMENT OPTIONS: ICD-10-CM

## 2023-12-12 DIAGNOSIS — Z78.9 NEVER SMOKED CIGARETTES: ICD-10-CM

## 2023-12-12 DIAGNOSIS — Z95.818 PRESENCE OF OTHER CARDIAC IMPLANTS AND GRAFTS: ICD-10-CM

## 2023-12-12 DIAGNOSIS — Z71.89 ENCOUNTER FOR MEDICATION REVIEW AND COUNSELING: ICD-10-CM

## 2023-12-12 DIAGNOSIS — E78.2 MIXED HYPERLIPIDEMIA: ICD-10-CM

## 2023-12-12 DIAGNOSIS — Z95.818 STATUS POST PLACEMENT OF IMPLANTABLE LOOP RECORDER: ICD-10-CM

## 2023-12-12 DIAGNOSIS — R00.1 SINUS BRADYCARDIA: ICD-10-CM

## 2023-12-12 DIAGNOSIS — I49.3 PREMATURE VENTRICULAR CONTRACTIONS: Primary | ICD-10-CM

## 2023-12-12 DIAGNOSIS — R55 SYNCOPE, UNSPECIFIED SYNCOPE TYPE: ICD-10-CM

## 2023-12-12 DIAGNOSIS — R55 SYNCOPE AND COLLAPSE: ICD-10-CM

## 2023-12-12 DIAGNOSIS — I47.10 PAROXYSMAL SUPRAVENTRICULAR TACHYCARDIA (CMS-HCC): ICD-10-CM

## 2023-12-12 PROCEDURE — 93291 INTERROG DEV EVAL SCRMS IP: CPT

## 2023-12-12 PROCEDURE — 99213 OFFICE O/P EST LOW 20 MIN: CPT | Performed by: INTERNAL MEDICINE

## 2023-12-12 PROCEDURE — 93000 ELECTROCARDIOGRAM COMPLETE: CPT | Performed by: INTERNAL MEDICINE

## 2023-12-12 PROCEDURE — 93291 INTERROG DEV EVAL SCRMS IP: CPT | Performed by: INTERNAL MEDICINE

## 2023-12-12 PROCEDURE — 3008F BODY MASS INDEX DOCD: CPT | Performed by: INTERNAL MEDICINE

## 2023-12-12 PROCEDURE — 1036F TOBACCO NON-USER: CPT | Performed by: INTERNAL MEDICINE

## 2023-12-12 RX ORDER — LANOLIN ALCOHOL/MO/W.PET/CERES
400 CREAM (GRAM) TOPICAL 2 TIMES DAILY
Qty: 180 TABLET | Refills: 3 | Status: SHIPPED | OUTPATIENT
Start: 2023-12-12 | End: 2024-04-04 | Stop reason: DRUGHIGH

## 2023-12-12 ASSESSMENT — ENCOUNTER SYMPTOMS
DYSPNEA ON EXERTION: 0
PALPITATIONS: 0
WHEEZING: 0

## 2023-12-12 NOTE — PROGRESS NOTES
"Chief Complaint:   Follow-up (6 month)     History Of Present Illness:    Salina Gordon is a 63 y.o. female presenting with follow-up.    She is not aware of her heartbeat.  She denies any palpitation, lightheadedness, near-syncope, or syncope     Last Recorded Vitals:  Vitals:    12/12/23 1222   BP: 118/70   BP Location: Left arm   Patient Position: Sitting   Pulse: 64   Weight: 79.4 kg (175 lb)   Height: 1.702 m (5' 7\")       Past Medical History:  See List    Past Surgical History:  See List      Social History:  She reports that she has never smoked. She has never used smokeless tobacco. She reports current alcohol use. She reports that she does not use drugs.    Family History:  Family History   Problem Relation Name Age of Onset    Hypertension Mother      Hyperlipidemia Mother      Other (age related macular degeneration) Mother      Cataracts Mother      Cataracts Father      COPD Father      Glaucoma Father      Leukemia Brother      Diabetes Other grandmother     Lymphoma Other grandmother         Allergies:  Patient has no known allergies.    Outpatient Medications:  Current Outpatient Medications   Medication Instructions    acyclovir (ZOVIRAX) 400 mg, oral, 3 times daily PRN    calcium carbonate-vitamin D3 1,000 mg-20 mcg (800 unit) tablet 1 tablet, oral, Daily    cholecalciferol (Vitamin D-3) 25 MCG (1000 UT) tablet 1 tablet, oral, Daily    gabapentin (Neurontin) 300 mg capsule 1 capsule, oral, Daily    meloxicam (Mobic) 15 mg tablet 1 tablet, oral, Daily PRN    omeprazole OTC (PriLOSEC OTC) 20 mg EC tablet 1 tablet, oral, Daily    rosuvastatin (CRESTOR) 20 mg, oral, Daily    sertraline (ZOLOFT) 50 mg, oral, Daily   Review of Systems   Cardiovascular:  Negative for chest pain, dyspnea on exertion and palpitations.   Respiratory:  Negative for wheezing.    All other systems reviewed and are negative.       Physical Exam:  Constitutional:       General: Awake.      Appearance: Normal and healthy " appearance. Well-developed and not in distress.   Neck:      Vascular: No JVR. JVD normal.   Pulmonary:      Effort: Pulmonary effort is normal.      Breath sounds: Normal breath sounds. No wheezing. No rhonchi. No rales.   Chest:      Chest wall: Not tender to palpatation.      Comments: Left sided loop recorder   Cardiovascular:      PMI at left midclavicular line. Normal rate. Regular rhythm. Normal S1. Normal S2.       Murmurs: There is no murmur.      No gallop.  No click. No rub.   Pulses:     Intact distal pulses.   Edema:     Peripheral edema absent.   Abdominal:      Tenderness: There is no abdominal tenderness.   Musculoskeletal: Normal range of motion.         General: No tenderness. Skin:     General: Skin is warm and dry.   Neurological:      General: No focal deficit present.      Mental Status: Alert and oriented to person, place and time.   Psychiatric:         Behavior: Behavior is cooperative.            Last Labs:  CBC -  Lab Results   Component Value Date    WBC 7.5 08/08/2022    HGB 12.9 08/08/2022    HCT 39.5 08/08/2022    MCV 89 08/08/2022     08/08/2022       CMP -  Lab Results   Component Value Date    CALCIUM 8.9 01/25/2023    PROT 6.5 01/25/2023    ALBUMIN 4.2 01/25/2023    AST 17 01/25/2023    ALT 16 01/25/2023    ALKPHOS 70 01/25/2023    BILITOT 0.5 01/25/2023       LIPID PANEL -   Lab Results   Component Value Date    CHOL 138 01/25/2023    TRIG 78 01/25/2023    HDL 47.3 01/25/2023    CHHDL 2.9 01/25/2023    LDLF 75 01/25/2023    VLDL 16 01/25/2023       RENAL FUNCTION PANEL -   Lab Results   Component Value Date    GLUCOSE 94 01/25/2023     01/25/2023    K 4.2 01/25/2023     01/25/2023    CO2 26 01/25/2023    ANIONGAP 13 01/25/2023    BUN 17 01/25/2023    CREATININE 0.78 01/25/2023    CALCIUM 8.9 01/25/2023    ALBUMIN 4.2 01/25/2023      PMH:    Loop implant August 2022  Tilt test August 2022. Vasodepressor and cardioinhibitory response to tilt testing after  "nitroglycerin.  Cardiac MRI Ejection fraction 73%. Normal hyperenhancement. Normal delayed enhancement. No fibrosis, infiltrative disease, scar, or inflammation. June 2022  48-hour Holter monitor revealed normal sinus rhythm average rate 70 bpm. Frequent premature ventricular contractions (6.4%). Ventricular ectopy present and isolated PVCs, ventricular couplets, ventricular triplets, ventricular bigeminy, and ventricular trigeminy. There brief atrial runs. Paroxysmal atrial tachycardia, 11 beat duration, rate 124 bpm. May 2022  Echocardiogram: EF 60%, mild mitral valve regurgitation, mild tricuspid valve regurgitation. May 2022  Graded exercise stress test: No ischemic EKG abnormalities or symptoms of chest pain achieving 10.1 METS. Frequent PVCs, ventricular bigeminy. May 2022  Coronary calcium score: 0. April 2022     PSH:  AVNRT status post ablation 2001.       Last Cardiology Tests:  ECG:  Today.  Normal sinus rhythm.  PVCs.  Left axis deviation.  Corrected QT interval 440 ms    Device check today. No events.  Previous \"atrial fibrillation and symptoms\" correlated with normal sinus rhythm, frequent PVCs, and noise with oversensing.  Reviewed in detail with patient.       Lab review: I have personally reviewed the laboratory result(s) see above    Assessment/Plan   Problem List Items Addressed This Visit             ICD-10-CM    Mixed hyperlipidemia E78.2    Paroxysmal supraventricular tachycardia I47.10    Premature ventricular contractions - Primary I49.3    Sinus bradycardia R00.1    Status post placement of implantable loop recorder Z95.818    Syncope R55    Never smoked cigarettes Z78.9    BMI 26.0-26.9,adult Z68.26    Encounter for medication review and counseling Z71.89    Encounter to discuss treatment options Z71.89       Syncope and palpitation.. NSVT by history. Isolate PVCs on loop recorder. Cardiac MRI with no infiltrative disease and no evidence of ARVC. LVEF 73% by cardiac MRI. Some symptoms " suggestive of vasovagal etiology of syncope. Abnormal tilt table test with vasodepressor and cardioinhibitory response. Status post loop recorder.  [employer] DM 4500 loop recorder. Reviewed device check in detail with patient.  No true atrial fibrillation.  No significant arrhythmias with symptoms.  Normal device function.  Will monitor clinically.  Continue monthly downloads. Twice yearly device clinic evaluation and EP office visit  Isolated PVCs.  Increase magnesium oxide to 400 mg twice daily.  Discussed risk of arrhythmias with ectopic beats.  Prescription sent.  S/p remote ablation of AVNRT. 2001. No clinical recurrence by loop recorder evaluations  Hyperlipidemia. On statin. Chronic. Stable. Last blood work reviewed.  AHA recommendations for exercise, diet, and behavioral modification reviewed with pt.     The patient and I discussed the mechanism of arrhythmia, PVCs, no true atrial fibrillation by loop recorder, magnesium, heart rate trend, no SVT on monitor, ECG, what are indications for and types of medications, discussion if and what medication refills needed, treatment options, risks, benefits, and imponderables. American Heart Association lifestyle changes and behavioral modification discussed. All questions answered in detail. Counseling over 50% visit regarding above. Patient appreciative of care.                Grammar     Please excuse grammatical or dictation errors as software dictation application being used.                   Adriana Mendoza RN

## 2023-12-12 NOTE — PATIENT INSTRUCTIONS
Continue same medications/treatment.  Patient educated on proper medication use.  Patient educated on risk factor modification.  Please bring any lab results from other providers/physicians to your next appointment.    Please bring all medicines, vitamins, and herbal supplements with you when you come to the office.    Prescriptions will not be filled unless you are compliant with your follow up appointments or have a follow up appointment scheduled as per instruction of your physician. Refills should be requested at the time of your visit.    Follow up with Dr. Wiggins in 1 year with loop recorder check  Continue monthly remote checks  INCREASE magnesium oxide to twice daily    BETSY COTTO RN, AM SCRIBING FOR AND IN THE PRESENCE OF DR. REAGAN WIGGINS MD, FACC, FACP, FHPS

## 2023-12-21 ENCOUNTER — OFFICE VISIT (OUTPATIENT)
Dept: PRIMARY CARE | Facility: CLINIC | Age: 63
End: 2023-12-21
Payer: COMMERCIAL

## 2023-12-21 VITALS
WEIGHT: 171 LBS | DIASTOLIC BLOOD PRESSURE: 81 MMHG | SYSTOLIC BLOOD PRESSURE: 125 MMHG | RESPIRATION RATE: 20 BRPM | OXYGEN SATURATION: 98 % | HEART RATE: 61 BPM | BODY MASS INDEX: 26.84 KG/M2 | HEIGHT: 67 IN

## 2023-12-21 DIAGNOSIS — Z11.59 ENCOUNTER FOR HEPATITIS C SCREENING TEST FOR LOW RISK PATIENT: ICD-10-CM

## 2023-12-21 DIAGNOSIS — U07.1 COVID-19 VIRUS INFECTION: ICD-10-CM

## 2023-12-21 DIAGNOSIS — E55.9 VITAMIN D DEFICIENCY: ICD-10-CM

## 2023-12-21 DIAGNOSIS — R55 SYNCOPE, UNSPECIFIED SYNCOPE TYPE: ICD-10-CM

## 2023-12-21 DIAGNOSIS — I47.10 PAROXYSMAL SUPRAVENTRICULAR TACHYCARDIA (CMS-HCC): Primary | ICD-10-CM

## 2023-12-21 DIAGNOSIS — N20.0 NEPHROLITHIASIS: ICD-10-CM

## 2023-12-21 DIAGNOSIS — Z91.89 FRAMINGHAM CARDIAC RISK <10% IN NEXT 10 YEARS: ICD-10-CM

## 2023-12-21 DIAGNOSIS — E78.2 MIXED HYPERLIPIDEMIA: ICD-10-CM

## 2023-12-21 DIAGNOSIS — F33.0 MILD RECURRENT MAJOR DEPRESSION (CMS-HCC): ICD-10-CM

## 2023-12-21 DIAGNOSIS — R73.9 BORDERLINE HYPERGLYCEMIA: ICD-10-CM

## 2023-12-21 DIAGNOSIS — L40.9 PSORIASIS: ICD-10-CM

## 2023-12-21 DIAGNOSIS — I49.3 PREMATURE VENTRICULAR CONTRACTIONS: ICD-10-CM

## 2023-12-21 DIAGNOSIS — M35.01 KERATITIS SICCA, BILATERAL (MULTI): ICD-10-CM

## 2023-12-21 PROBLEM — S82.009A PATELLA FRACTURE: Status: RESOLVED | Noted: 2023-10-04 | Resolved: 2023-12-21

## 2023-12-21 PROBLEM — S82.143A TIBIAL PLATEAU FRACTURE: Status: RESOLVED | Noted: 2023-10-04 | Resolved: 2023-12-21

## 2023-12-21 PROBLEM — M75.100 RCT (ROTATOR CUFF TEAR): Status: RESOLVED | Noted: 2023-10-04 | Resolved: 2023-12-21

## 2023-12-21 PROBLEM — Z71.89 ENCOUNTER FOR MEDICATION REVIEW AND COUNSELING: Status: RESOLVED | Noted: 2023-12-12 | Resolved: 2023-12-21

## 2023-12-21 PROBLEM — Z71.89 ENCOUNTER TO DISCUSS TREATMENT OPTIONS: Status: RESOLVED | Noted: 2023-12-12 | Resolved: 2023-12-21

## 2023-12-21 PROBLEM — J04.0 REFLUX LARYNGITIS: Status: RESOLVED | Noted: 2023-10-04 | Resolved: 2023-12-21

## 2023-12-21 PROBLEM — R06.89 DIFFICULTY BREATHING: Status: RESOLVED | Noted: 2023-10-04 | Resolved: 2023-12-21

## 2023-12-21 PROBLEM — L30.9 DERMATITIS: Status: RESOLVED | Noted: 2023-04-13 | Resolved: 2023-12-21

## 2023-12-21 PROBLEM — G89.29 CHRONIC LEFT SHOULDER PAIN: Status: RESOLVED | Noted: 2023-10-05 | Resolved: 2023-12-21

## 2023-12-21 PROBLEM — N13.30 HYDRONEPHROSIS: Status: RESOLVED | Noted: 2023-10-04 | Resolved: 2023-12-21

## 2023-12-21 PROBLEM — K21.9 REFLUX LARYNGITIS: Status: RESOLVED | Noted: 2023-10-04 | Resolved: 2023-12-21

## 2023-12-21 PROBLEM — M19.90 CHRONIC OSTEOARTHRITIS: Status: RESOLVED | Noted: 2023-04-13 | Resolved: 2023-12-21

## 2023-12-21 PROBLEM — M25.512 CHRONIC LEFT SHOULDER PAIN: Status: RESOLVED | Noted: 2023-10-05 | Resolved: 2023-12-21

## 2023-12-21 PROBLEM — F32.9 REACTIVE DEPRESSION: Status: RESOLVED | Noted: 2023-04-13 | Resolved: 2023-12-21

## 2023-12-21 PROBLEM — R79.89 LOW TSH LEVEL: Status: RESOLVED | Noted: 2023-10-04 | Resolved: 2023-12-21

## 2023-12-21 PROBLEM — F41.9 ANXIETY: Status: RESOLVED | Noted: 2023-10-04 | Resolved: 2023-12-21

## 2023-12-21 PROBLEM — M70.70 HIP BURSITIS: Status: RESOLVED | Noted: 2023-10-04 | Resolved: 2023-12-21

## 2023-12-21 PROBLEM — R06.00 DYSPNEA: Status: RESOLVED | Noted: 2023-10-04 | Resolved: 2023-12-21

## 2023-12-21 PROBLEM — R00.2 PALPITATIONS: Status: RESOLVED | Noted: 2023-04-13 | Resolved: 2023-12-21

## 2023-12-21 PROCEDURE — 1036F TOBACCO NON-USER: CPT | Performed by: INTERNAL MEDICINE

## 2023-12-21 PROCEDURE — RXMED WILLOW AMBULATORY MEDICATION CHARGE

## 2023-12-21 PROCEDURE — 99214 OFFICE O/P EST MOD 30 MIN: CPT | Performed by: INTERNAL MEDICINE

## 2023-12-21 PROCEDURE — 3008F BODY MASS INDEX DOCD: CPT | Performed by: INTERNAL MEDICINE

## 2023-12-21 RX ORDER — TRIAMCINOLONE ACETONIDE 1 MG/G
1 OINTMENT TOPICAL EVERY 8 HOURS
Qty: 80 G | Refills: 1 | Status: SHIPPED | OUTPATIENT
Start: 2023-12-21

## 2023-12-21 RX ORDER — MINERAL OIL
180 ENEMA (ML) RECTAL
Qty: 90 TABLET | Refills: 1 | Status: SHIPPED | OUTPATIENT
Start: 2023-12-21

## 2023-12-21 NOTE — PATIENT INSTRUCTIONS
"Thank you very much for coming.  I am very happy to meet you!  We are now both orphan siblings now that Dr. Corbett retired!    You are due for FASTING laboratory examinations to be done anytime soon.  Please have this done before the end of the year.  I will send word regarding results and possible changes.    In the meantime, I am glad to hear that you are doing well with the help of Dr. Wiggins and Dr. Kelly.  Please continue to follow with their recommendations and plans.  I will also check some indexes, kidney function, liver function, magnesium, thyroid, potassium, all of these contribute to healthy heart and function and rhythm!    Please continue to stay physically active, especially with AEROBIC activities.  This will also help maintain your mood, energy, even your figure!    Please continue to drink lots of fluids throughout the day.    I am glad to hear that you recovered from COVID.  You will not be eligible for the BOOSTER until February 1.  Until then, please continue to practice social distancing and frequent handwashing, so that you do not get reinfected.    I will classify your condition as \"mild recurrent major depression.\"  This is the mildest form of depression applicable to your situation.  I think that you are doing well with your generic ZOLOFT.  Please do not miss your medications.    Please follow-up with your OPHTHALMOLOGIST as needed.  Please follow-up with Dr. Vizcaino, your NEUROLOGIST, as well.  If he is unable to take care of your RESTLESS LEGS, please let me know, and I will get you started on a regimen.  I am also going to check things that might be contributing to your restless legs, thyroid function, B12 deficiency.    I would classify your SKIN condition as PSORIASIS.  Of course, this gets worse during the winter, especially when you get itchy.  Try your best not to scratch of course.  Drink lots of fluids throughout the day.  FEXOFENADINE/Allegra 24-hour will help control your " symptoms.  Take this with SUPPER every evening, starting tonight.  Likewise, applying TRIAMCINOLONE steroid ointment will help decrease the irritation and itching.  Again, minimize scratching, drink lots of fluids.  Also, use gentle soap like Dove or Oil of Olay.    Again, it is a pleasure to meet you.  Please do not hesitate to call with questions or concerns.  Please continue to take care of yourself and your family, and please continue to pray for our recovery from this pandemic.  I hope you have a good [place], and a happy new year!            0  Return in 2 months.  40 minutes please.  COMPLETE physical examination.  Review preventive strategies, cardiovascular risk, fasting laboratory results.  Coordinate with cardiology, EP, nephrology, urology, orthopedics, neurology, specialists that have been seeing her.  Also seeing gynecology regularly.            0

## 2023-12-21 NOTE — PROGRESS NOTES
"Subjective   Patient ID: Salina Gordon is a 63 y.o. female who presents for [facility].    HPI     Review of Systems    Objective   /81 (BP Location: Left arm, Patient Position: Sitting, BP Cuff Size: Adult)   Pulse 61   Resp 20   Ht 1.702 m (5' 7\")   Wt 77.6 kg (171 lb)   SpO2 98%   BMI 26.78 kg/m²     Physical Exam    Assessment/Plan   Diagnoses and all orders for this visit:  Paroxysmal supraventricular tachycardia  -     CBC and Auto Differential; Future  -     Comprehensive Metabolic Panel; Future  -     Magnesium; Future  -     TSH with reflex to Free T4 if abnormal; Future  -     Follow Up In Primary Care - Established; Future  Premature ventricular contractions  -     CBC and Auto Differential; Future  -     Comprehensive Metabolic Panel; Future  -     Magnesium; Future  -     TSH with reflex to Free T4 if abnormal; Future  -     Follow Up In Primary Care - Established; Future  Syncope, unspecified syncope type  -     CBC and Auto Differential; Future  -     Comprehensive Metabolic Panel; Future  -     Magnesium; Future  -     TSH with reflex to Free T4 if abnormal; Future  -     Vitamin B12; Future  -     Follow Up In Primary Care - Established; Future  Mixed hyperlipidemia  -     Comprehensive Metabolic Panel; Future  -     Lipid Panel; Future  -     Follow Up In Primary Care - Established; Future  Borderline hyperglycemia  -     Comprehensive Metabolic Panel; Future  -     Hemoglobin A1C; Future  -     Follow Up In Primary Care - Established; Future  [place] cardiac risk <10% in next 10 years  Comments:  3.3% 12/23  Orders:  -     Comprehensive Metabolic Panel; Future  -     Hemoglobin A1C; Future  -     Lipid Panel; Future  -     Follow Up In Primary Care - Established; Future  COVID-19 virus infection  Comments:  11/01/23  Orders:  -     CBC and Auto Differential; Future  -     Follow Up In Primary Care - Established; Future  Mild recurrent major depression (CMS/HCC)  -     TSH with " reflex to Free T4 if abnormal; Future  -     Vitamin B12; Future  -     Follow Up In Primary Care - Established; Future  Keratitis sicca, bilateral (CMS/HCC)  -     TSH with reflex to Free T4 if abnormal; Future  -     Vitamin B12; Future  -     Follow Up In Primary Care - Established; Future  Nephrolithiasis  -     CBC and Auto Differential; Future  -     Comprehensive Metabolic Panel; Future  -     Urinalysis with Reflex Culture and Microscopic; Future  -     Follow Up In Primary Care - Established; Future  Vitamin D deficiency  -     Comprehensive Metabolic Panel; Future  -     Vitamin D 25-Hydroxy,Total (for eval of Vitamin D levels); Future  -     Follow Up In Primary Care - Established; Future  Psoriasis  -     CBC and Auto Differential; Future  -     Vitamin B12; Future  -     fexofenadine (Allegra) 180 mg tablet; Please take 1 tablet by mouth with SUPPER every evening.  Thank you.  -     triamcinolone (Kenalog) 0.1 % ointment; Please apply thin layer to affected areas every 8 hours.  Do not cover area with bandage.  Thank you.  -     Follow Up In Primary Care - Established; Future  Encounter for hepatitis C screening test for low risk patient  -     Comprehensive Metabolic Panel; Future  -     Hepatitis C Antibody; Future  -     Follow Up In Primary Care - Established; Future       Thank you very much for coming.  I am very happy to meet you!  We are now both orphan siblings now that Dr. Corbett retired!    You are due for FASTING laboratory examinations to be done anytime soon.  Please have this done before the end of the year.  I will send word regarding results and possible changes.    In the meantime, I am glad to hear that you are doing well with the help of Dr. Wiggins and Dr. Kelly.  Please continue to follow with their recommendations and plans.  I will also check some indexes, kidney function, liver function, magnesium, thyroid, potassium, all of these contribute to healthy heart and function and  "rhythm!    Please continue to stay physically active, especially with AEROBIC activities.  This will also help maintain your mood, energy, even your figure!    Please continue to drink lots of fluids throughout the day.    I am glad to hear that you recovered from COVID.  You will not be eligible for the BOOSTER until February 1.  Until then, please continue to practice social distancing and frequent handwashing, so that you do not get reinfected.    I will classify your condition as \"mild recurrent major depression.\"  This is the mildest form of depression applicable to your situation.  I think that you are doing well with your generic ZOLOFT.  Please do not miss your medications.    Please follow-up with your OPHTHALMOLOGIST as needed.  Please follow-up with Dr. Vizcaino, your NEUROLOGIST, as well.  If he is unable to take care of your RESTLESS LEGS, please let me know, and I will get you started on a regimen.  I am also going to check things that might be contributing to your restless legs, thyroid function, B12 deficiency.    I would classify your SKIN condition as PSORIASIS.  Of course, this gets worse during the winter, especially when you get itchy.  Try your best not to scratch of course.  Drink lots of fluids throughout the day.  FEXOFENADINE/Allegra 24-hour will help control your symptoms.  Take this with SUPPER every evening, starting tonight.  Likewise, applying TRIAMCINOLONE steroid ointment will help decrease the irritation and itching.  Again, minimize scratching, drink lots of fluids.  Also, use gentle soap like Dove or Oil of Olay.    Again, it is a pleasure to meet you.  Please do not hesitate to call with questions or concerns.  Please continue to take care of yourself and your family, and please continue to pray for our recovery from this pandemic.  I hope you have a good [place], and a happy new year!            0  Return in 2 months.  40 minutes please.  COMPLETE physical examination.  Review " preventive strategies, cardiovascular risk, fasting laboratory results.  Coordinate with cardiology, EP, nephrology, urology, orthopedics, neurology, specialists that have been seeing her.  Also seeing gynecology regularly.            0

## 2023-12-22 ENCOUNTER — HOSPITAL ENCOUNTER (OUTPATIENT)
Dept: CARDIOLOGY | Facility: HOSPITAL | Age: 63
Discharge: HOME | End: 2023-12-22
Payer: COMMERCIAL

## 2023-12-22 DIAGNOSIS — Z95.818 PRESENCE OF OTHER CARDIAC IMPLANTS AND GRAFTS: ICD-10-CM

## 2023-12-22 DIAGNOSIS — R55 SYNCOPE AND COLLAPSE: ICD-10-CM

## 2023-12-27 ENCOUNTER — PHARMACY VISIT (OUTPATIENT)
Dept: PHARMACY | Facility: CLINIC | Age: 63
End: 2023-12-27
Payer: COMMERCIAL

## 2023-12-27 PROCEDURE — RXMED WILLOW AMBULATORY MEDICATION CHARGE

## 2023-12-28 ENCOUNTER — LAB (OUTPATIENT)
Dept: LAB | Facility: LAB | Age: 63
End: 2023-12-28
Payer: COMMERCIAL

## 2023-12-28 DIAGNOSIS — U07.1 COVID-19 VIRUS INFECTION: ICD-10-CM

## 2023-12-28 DIAGNOSIS — F33.0 MILD RECURRENT MAJOR DEPRESSION (CMS-HCC): ICD-10-CM

## 2023-12-28 DIAGNOSIS — E55.9 VITAMIN D DEFICIENCY: ICD-10-CM

## 2023-12-28 DIAGNOSIS — Z91.89 FRAMINGHAM CARDIAC RISK <10% IN NEXT 10 YEARS: ICD-10-CM

## 2023-12-28 DIAGNOSIS — I47.10 PAROXYSMAL SUPRAVENTRICULAR TACHYCARDIA (CMS-HCC): ICD-10-CM

## 2023-12-28 DIAGNOSIS — E78.2 MIXED HYPERLIPIDEMIA: ICD-10-CM

## 2023-12-28 DIAGNOSIS — R73.9 BORDERLINE HYPERGLYCEMIA: ICD-10-CM

## 2023-12-28 DIAGNOSIS — N20.0 NEPHROLITHIASIS: ICD-10-CM

## 2023-12-28 DIAGNOSIS — I49.3 PREMATURE VENTRICULAR CONTRACTIONS: ICD-10-CM

## 2023-12-28 DIAGNOSIS — Z11.59 ENCOUNTER FOR HEPATITIS C SCREENING TEST FOR LOW RISK PATIENT: ICD-10-CM

## 2023-12-28 DIAGNOSIS — M35.01 KERATITIS SICCA, BILATERAL (MULTI): ICD-10-CM

## 2023-12-28 DIAGNOSIS — R55 SYNCOPE, UNSPECIFIED SYNCOPE TYPE: ICD-10-CM

## 2023-12-28 DIAGNOSIS — L40.9 PSORIASIS: ICD-10-CM

## 2023-12-28 LAB
25(OH)D3 SERPL-MCNC: 23 NG/ML (ref 30–100)
ALBUMIN SERPL BCP-MCNC: 4.1 G/DL (ref 3.4–5)
ALP SERPL-CCNC: 83 U/L (ref 33–136)
ALT SERPL W P-5'-P-CCNC: 14 U/L (ref 7–45)
ANION GAP SERPL CALC-SCNC: 14 MMOL/L (ref 10–20)
AST SERPL W P-5'-P-CCNC: 16 U/L (ref 9–39)
BASOPHILS # BLD AUTO: 0.03 X10*3/UL (ref 0–0.1)
BASOPHILS NFR BLD AUTO: 0.6 %
BILIRUB SERPL-MCNC: 0.7 MG/DL (ref 0–1.2)
BUN SERPL-MCNC: 22 MG/DL (ref 6–23)
CALCIUM SERPL-MCNC: 8.7 MG/DL (ref 8.6–10.3)
CHLORIDE SERPL-SCNC: 105 MMOL/L (ref 98–107)
CHOLEST SERPL-MCNC: 272 MG/DL (ref 0–199)
CHOLESTEROL/HDL RATIO: 5.8
CO2 SERPL-SCNC: 26 MMOL/L (ref 21–32)
CREAT SERPL-MCNC: 0.77 MG/DL (ref 0.5–1.05)
EOSINOPHIL # BLD AUTO: 0.16 X10*3/UL (ref 0–0.7)
EOSINOPHIL NFR BLD AUTO: 3.1 %
ERYTHROCYTE [DISTWIDTH] IN BLOOD BY AUTOMATED COUNT: 13.1 % (ref 11.5–14.5)
EST. AVERAGE GLUCOSE BLD GHB EST-MCNC: 105 MG/DL
GFR SERPL CREATININE-BSD FRML MDRD: 87 ML/MIN/1.73M*2
GLUCOSE SERPL-MCNC: 89 MG/DL (ref 74–99)
HBA1C MFR BLD: 5.3 %
HCT VFR BLD AUTO: 40.2 % (ref 36–46)
HCV AB SER QL: NONREACTIVE
HDLC SERPL-MCNC: 46.9 MG/DL
HGB BLD-MCNC: 13.6 G/DL (ref 12–16)
IMM GRANULOCYTES # BLD AUTO: 0.01 X10*3/UL (ref 0–0.7)
IMM GRANULOCYTES NFR BLD AUTO: 0.2 % (ref 0–0.9)
LDLC SERPL CALC-MCNC: 195 MG/DL
LYMPHOCYTES # BLD AUTO: 1.74 X10*3/UL (ref 1.2–4.8)
LYMPHOCYTES NFR BLD AUTO: 33.2 %
MAGNESIUM SERPL-MCNC: 1.93 MG/DL (ref 1.6–2.4)
MCH RBC QN AUTO: 30 PG (ref 26–34)
MCHC RBC AUTO-ENTMCNC: 33.8 G/DL (ref 32–36)
MCV RBC AUTO: 89 FL (ref 80–100)
MONOCYTES # BLD AUTO: 0.52 X10*3/UL (ref 0.1–1)
MONOCYTES NFR BLD AUTO: 9.9 %
NEUTROPHILS # BLD AUTO: 2.78 X10*3/UL (ref 1.2–7.7)
NEUTROPHILS NFR BLD AUTO: 53 %
NON HDL CHOLESTEROL: 225 MG/DL (ref 0–149)
NRBC BLD-RTO: 0 /100 WBCS (ref 0–0)
PLATELET # BLD AUTO: 316 X10*3/UL (ref 150–450)
POTASSIUM SERPL-SCNC: 4.5 MMOL/L (ref 3.5–5.3)
PROT SERPL-MCNC: 6.5 G/DL (ref 6.4–8.2)
RBC # BLD AUTO: 4.54 X10*6/UL (ref 4–5.2)
SODIUM SERPL-SCNC: 140 MMOL/L (ref 136–145)
TRIGL SERPL-MCNC: 153 MG/DL (ref 0–149)
TSH SERPL-ACNC: 1.45 MIU/L (ref 0.44–3.98)
VIT B12 SERPL-MCNC: 298 PG/ML (ref 211–911)
VLDL: 31 MG/DL (ref 0–40)
WBC # BLD AUTO: 5.2 X10*3/UL (ref 4.4–11.3)

## 2023-12-28 PROCEDURE — 84443 ASSAY THYROID STIM HORMONE: CPT

## 2023-12-28 PROCEDURE — 36415 COLL VENOUS BLD VENIPUNCTURE: CPT

## 2023-12-28 PROCEDURE — 86803 HEPATITIS C AB TEST: CPT

## 2023-12-28 PROCEDURE — 82607 VITAMIN B-12: CPT

## 2023-12-28 PROCEDURE — 80061 LIPID PANEL: CPT

## 2023-12-28 PROCEDURE — 83036 HEMOGLOBIN GLYCOSYLATED A1C: CPT

## 2023-12-28 PROCEDURE — 80053 COMPREHEN METABOLIC PANEL: CPT

## 2023-12-28 PROCEDURE — 82306 VITAMIN D 25 HYDROXY: CPT

## 2023-12-28 PROCEDURE — 85025 COMPLETE CBC W/AUTO DIFF WBC: CPT

## 2023-12-28 PROCEDURE — 83735 ASSAY OF MAGNESIUM: CPT

## 2023-12-29 ENCOUNTER — HOSPITAL ENCOUNTER (OUTPATIENT)
Dept: CARDIOLOGY | Facility: HOSPITAL | Age: 63
Discharge: HOME | End: 2023-12-29
Payer: COMMERCIAL

## 2023-12-29 DIAGNOSIS — R55 SYNCOPE AND COLLAPSE: ICD-10-CM

## 2023-12-29 DIAGNOSIS — Z95.818 PRESENCE OF OTHER CARDIAC IMPLANTS AND GRAFTS: ICD-10-CM

## 2023-12-29 LAB
APPEARANCE UR: CLEAR
BILIRUB UR STRIP.AUTO-MCNC: NEGATIVE MG/DL
COLOR UR: YELLOW
GLUCOSE UR STRIP.AUTO-MCNC: NEGATIVE MG/DL
KETONES UR STRIP.AUTO-MCNC: ABNORMAL MG/DL
LEUKOCYTE ESTERASE UR QL STRIP.AUTO: NEGATIVE
NITRITE UR QL STRIP.AUTO: NEGATIVE
PH UR STRIP.AUTO: 5 [PH]
PROT UR STRIP.AUTO-MCNC: NEGATIVE MG/DL
RBC # UR STRIP.AUTO: NEGATIVE /UL
SP GR UR STRIP.AUTO: 1.03
UROBILINOGEN UR STRIP.AUTO-MCNC: <2 MG/DL

## 2023-12-29 PROCEDURE — 81003 URINALYSIS AUTO W/O SCOPE: CPT | Performed by: INTERNAL MEDICINE

## 2023-12-30 LAB — HOLD SPECIMEN: NORMAL

## 2024-01-19 ENCOUNTER — HOSPITAL ENCOUNTER (OUTPATIENT)
Dept: CARDIOLOGY | Facility: HOSPITAL | Age: 64
Discharge: HOME | End: 2024-01-19
Payer: COMMERCIAL

## 2024-01-19 DIAGNOSIS — Z95.818 PRESENCE OF OTHER CARDIAC IMPLANTS AND GRAFTS: ICD-10-CM

## 2024-01-19 DIAGNOSIS — R55 SYNCOPE AND COLLAPSE: ICD-10-CM

## 2024-01-19 PROCEDURE — 93298 REM INTERROG DEV EVAL SCRMS: CPT | Performed by: INTERNAL MEDICINE

## 2024-01-19 PROCEDURE — 93297 REM INTERROG DEV EVAL ICPMS: CPT

## 2024-01-26 ENCOUNTER — HOSPITAL ENCOUNTER (OUTPATIENT)
Dept: CARDIOLOGY | Facility: HOSPITAL | Age: 64
Discharge: HOME | End: 2024-01-26
Payer: COMMERCIAL

## 2024-01-26 DIAGNOSIS — Z95.818 PRESENCE OF OTHER CARDIAC IMPLANTS AND GRAFTS: ICD-10-CM

## 2024-01-26 DIAGNOSIS — R55 SYNCOPE AND COLLAPSE: ICD-10-CM

## 2024-01-29 DIAGNOSIS — K12.1 STOMATITIS: Primary | ICD-10-CM

## 2024-01-29 DIAGNOSIS — B09 VIRAL EXANTHEM: ICD-10-CM

## 2024-02-01 PROCEDURE — RXMED WILLOW AMBULATORY MEDICATION CHARGE

## 2024-02-01 RX ORDER — ACYCLOVIR 400 MG/1
400 TABLET ORAL 3 TIMES DAILY PRN
Qty: 30 TABLET | Refills: 0 | Status: SHIPPED | OUTPATIENT
Start: 2024-02-01

## 2024-02-05 ENCOUNTER — PHARMACY VISIT (OUTPATIENT)
Dept: PHARMACY | Facility: CLINIC | Age: 64
End: 2024-02-05
Payer: COMMERCIAL

## 2024-02-05 ENCOUNTER — OFFICE VISIT (OUTPATIENT)
Dept: CARDIOLOGY | Facility: CLINIC | Age: 64
End: 2024-02-05
Payer: COMMERCIAL

## 2024-02-05 VITALS
BODY MASS INDEX: 26.84 KG/M2 | HEIGHT: 67 IN | HEART RATE: 60 BPM | SYSTOLIC BLOOD PRESSURE: 118 MMHG | WEIGHT: 171 LBS | DIASTOLIC BLOOD PRESSURE: 64 MMHG

## 2024-02-05 DIAGNOSIS — E78.2 MIXED HYPERLIPIDEMIA: Primary | ICD-10-CM

## 2024-02-05 DIAGNOSIS — R55 SYNCOPE, UNSPECIFIED SYNCOPE TYPE: ICD-10-CM

## 2024-02-05 DIAGNOSIS — R00.1 SINUS BRADYCARDIA: ICD-10-CM

## 2024-02-05 DIAGNOSIS — Z78.9 NEVER SMOKED CIGARETTES: ICD-10-CM

## 2024-02-05 DIAGNOSIS — I47.10 PAROXYSMAL SUPRAVENTRICULAR TACHYCARDIA (CMS-HCC): ICD-10-CM

## 2024-02-05 PROCEDURE — 1036F TOBACCO NON-USER: CPT | Performed by: INTERNAL MEDICINE

## 2024-02-05 PROCEDURE — 99214 OFFICE O/P EST MOD 30 MIN: CPT | Performed by: INTERNAL MEDICINE

## 2024-02-05 PROCEDURE — 3008F BODY MASS INDEX DOCD: CPT | Performed by: INTERNAL MEDICINE

## 2024-02-05 PROCEDURE — RXOTC WILLOW AMBULATORY OTC CHARGE

## 2024-02-05 NOTE — PROGRESS NOTES
CARDIOLOGY OFFICE VISIT      CHIEF COMPLAINT  Chief Complaint   Patient presents with    Follow-up     1 year        HISTORY OF PRESENT ILLNESS  Patient is a 63-year-old  female with past medical history significant for AV gypsy reentrant tachycardia, radiofrequency ablation , sinus bradycardia, hypercholesterolemia, nephrolithiasis who presents with a syncopal episode.  Patient has had some palpitations usually associated with stress and will have associated dyspnea.  Denies chest pain, dyspnea exertion, nausea, vomiting, dizziness, near-syncope, bc syncope, edema.  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  She currently drinks 4 cups of coffee per week.  She had a recent right rotator cuff tear that has been conservatively managed by Dr. Pop.  Patient has not been taking her cholesterol medication.       Past surgical history:  D&C  Right shoulder surgery  Lithotripsy     Social history:  Non-smoker  Rare alcohol use     Family history:  Father  82 COPD  Mother history of hypertension, hypercholesterolemia     Review of systems have been reviewed and are negative, noncontributory, or as previous mentioned x12 systems.     I personally reviewed EKG March 15, 2022: Normal sinus rhythm, anterior T wave inversion. No significant change compared to previous EKG December 10, 2015     Assessment:     Frequent ventricular ectopy  AV gypsy reentrant tachycardia s/p RFA   Sinus bradycardia  History of syncope  Hyperlipidemia  Nephrolithiasis  Intolerance magnesium oxide 400 mg twice daily due to diarrhea     Recommendations:  Patient appears to be stable from a cardiac standpoint. No recurrent syncope. Patient's been started on magnesium oxide for ventricular ectopy and palpitations. No symptoms of angina. No evidence of heart failure. We reviewed normal cardiac MRI findings.  Resume rosuvastatin.  Reengage in exercise program   minutes/day.  Routine follow-up with EP.  Follow-up with myself in 1 year.  Check CMP, lipid profile at that time.     Please excuse any errors in grammar or translation related to this dictation. Voice recognition software was utilized to prepare this document.     Recent Cardiovascular Testing:  The following results have been reviewed and updated.     Labs 12/28/23  1.T C 272, , HDL 46,         Carotid US 5/25/22  Mild     CT calcium score 4/27/22  Score = 0     MRI Cardiac 6/30/22  1.EF  72%  2.Delayed enhancement imaging is normal     Echo: 5/25/22  1. EF 60%.  2. Mild mitral valve regurgitation  3. Mild tricuspid regurgitation        GXT 5/25/22  1. Normal.  2. 10.1 METS  3. Frequent PVC's, ventricular bigeminy      48 Holter: 5/25/22  1. NSR  bpm, avg 70 bpm  2. Frequent PVC's (6.4%) ventricular bigeminy, ventricular couplets and triplets  3. PAT, longest 11 beats, rate 124 bpm.        VITALS  Vitals:    02/05/24 1524   BP: 118/64   Pulse: 60     Wt Readings from Last 4 Encounters:   02/05/24 77.6 kg (171 lb)   12/21/23 77.6 kg (171 lb)   12/12/23 79.4 kg (175 lb)   11/06/23 77.1 kg (170 lb)       PHYSICAL EXAM:  GENERAL:  Well developed, well nourished, in no acute distress.  CHEST:  Symmetric and nontender.  NEURO/PSYCH:  Alert and oriented times three with approppriate behavior and responses.  NECK:  Supple, no JVD, no bruit.  LUNGS:  Clear to auscultation bilaterally, normal respiratory effort.  HEART:  Rate and rhythm regular with no evident murmur, no gallop appreciated.                   There are no rubs, clicks or heaves.  EXTREMITIES:  Warm with good color, no clubbing or cyanosis.  There is no edema noted.  PERIPHERAL VASCULAR:  Pulses present and equally palpable; 2+ throughout.    ASSESSMENT AND PLAN  Diagnoses and all orders for this visit:  Mixed hyperlipidemia  Paroxysmal supraventricular tachycardia  Sinus bradycardia  Syncope, unspecified syncope type  Never smoked  cigarettes  BMI 26.0-26.9,adult      Past Medical History  Past Medical History:   Diagnosis Date    Personal history of other endocrine, nutritional and metabolic disease     History of hypercholesterolemia    Supraventricular tachycardia     Sustained SVT       Social History  Social History     Tobacco Use    Smoking status: Never    Smokeless tobacco: Never   Substance Use Topics    Alcohol use: Yes     Comment: rare    Drug use: Never       Family History     Family History   Problem Relation Name Age of Onset    Hypertension Mother      Hyperlipidemia Mother      Other (age related macular degeneration) Mother      Cataracts Mother      Cataracts Father      COPD Father      Glaucoma Father      Leukemia Brother      Diabetes Other grandmother     Lymphoma Other grandmother         Allergies:  No Known Allergies     Outpatient Medications:  Current Outpatient Medications   Medication Instructions    acyclovir (ZOVIRAX) 400 mg, oral, 3 times daily PRN    calcium carbonate-vitamin D3 1,000 mg-20 mcg (800 unit) tablet 1 tablet, oral, Daily    cholecalciferol (Vitamin D-3) 25 MCG (1000 UT) tablet 1 tablet, oral, Daily    fexofenadine (ALLEGRA) 180 mg, oral, Daily with evening meal    gabapentin (Neurontin) 300 mg capsule 1 capsule, oral, Daily    magnesium oxide (MAG-OX) 400 mg, oral, 2 times daily    meloxicam (Mobic) 15 mg tablet 1 tablet, oral, Daily PRN    omeprazole OTC (PriLOSEC OTC) 20 mg EC tablet 1 tablet, oral, Daily    rosuvastatin (CRESTOR) 20 mg, oral, Daily    sertraline (ZOLOFT) 50 mg, oral, Daily    triamcinolone (Kenalog) 0.1 % ointment Apply thin layer topically to affected area(s) every 8 hours. Do not cover with bandage.        Recent Lab Results:    CMP:    Lab Results   Component Value Date     12/28/2023    K 4.5 12/28/2023     12/28/2023    CO2 26 12/28/2023    BUN 22 12/28/2023    CREATININE 0.77 12/28/2023    GLUCOSE 89 12/28/2023    CALCIUM 8.7 12/28/2023       Magnesium:   "  Lab Results   Component Value Date    MG 1.93 12/28/2023       Lipid Profile:    Lab Results   Component Value Date    TRIG 153 (H) 12/28/2023    HDL 46.9 12/28/2023    LDLCALC 195 (H) 12/28/2023       TSH:    Lab Results   Component Value Date    TSH 1.45 12/28/2023       BNP:   No results found for: \"BNP\"     PT/INR:    Lab Results   Component Value Date    PROTIME 12.1 08/08/2022    INR 1.0 08/08/2022       HgBA1c:    Lab Results   Component Value Date    HGBA1C 5.3 12/28/2023       BMP:  Lab Results   Component Value Date     12/28/2023     01/25/2023     08/08/2022     03/15/2022    K 4.5 12/28/2023    K 4.2 01/25/2023    K 4.0 08/08/2022    K 4.0 03/15/2022     12/28/2023     01/25/2023     08/08/2022     03/15/2022    CO2 26 12/28/2023    CO2 26 01/25/2023    CO2 26 08/08/2022    CO2 25 03/15/2022    BUN 22 12/28/2023    BUN 17 01/25/2023    BUN 25 (H) 08/08/2022    BUN 20 03/15/2022    CREATININE 0.77 12/28/2023    CREATININE 0.78 01/25/2023    CREATININE 0.83 08/08/2022    CREATININE 0.78 03/15/2022       CBC:  Lab Results   Component Value Date    WBC 5.2 12/28/2023    WBC 7.5 08/08/2022    WBC 6.0 03/15/2022    WBC 17.4 (H) 01/04/2022    RBC 4.54 12/28/2023    RBC 4.45 08/08/2022    RBC 4.27 03/15/2022    RBC 4.61 01/04/2022    HGB 13.6 12/28/2023    HGB 12.9 08/08/2022    HGB 12.8 03/15/2022    HGB 13.6 01/04/2022    HCT 40.2 12/28/2023    HCT 39.5 08/08/2022    HCT 38.6 03/15/2022    HCT 42.4 01/04/2022    MCV 89 12/28/2023    MCV 89 08/08/2022    MCV 90 03/15/2022    MCV 92 01/04/2022    MCH 30.0 12/28/2023    MCHC 33.8 12/28/2023    MCHC 32.7 08/08/2022    MCHC 33.2 03/15/2022    MCHC 32.1 01/04/2022    RDW 13.1 12/28/2023    RDW 13.4 08/08/2022    RDW 13.6 03/15/2022    RDW 13.6 01/04/2022     12/28/2023     08/08/2022     03/15/2022     01/04/2022       Cardiac Enzymes:    No results found for: \"TROPHS\"    Hepatic Function " Panel:    Lab Results   Component Value Date    ALKPHOS 83 12/28/2023    ALT 14 12/28/2023    AST 16 12/28/2023    PROT 6.5 12/28/2023    BILITOT 0.7 12/28/2023           Marino Brown DO

## 2024-02-09 ENCOUNTER — HOSPITAL ENCOUNTER (OUTPATIENT)
Dept: CARDIOLOGY | Facility: HOSPITAL | Age: 64
Discharge: HOME | End: 2024-02-09
Payer: COMMERCIAL

## 2024-02-09 DIAGNOSIS — Z95.818 PRESENCE OF OTHER CARDIAC IMPLANTS AND GRAFTS: ICD-10-CM

## 2024-02-09 DIAGNOSIS — R55 SYNCOPE AND COLLAPSE: ICD-10-CM

## 2024-02-16 ENCOUNTER — HOSPITAL ENCOUNTER (OUTPATIENT)
Dept: CARDIOLOGY | Facility: HOSPITAL | Age: 64
Discharge: HOME | End: 2024-02-16
Payer: COMMERCIAL

## 2024-02-16 DIAGNOSIS — Z95.818 PRESENCE OF OTHER CARDIAC IMPLANTS AND GRAFTS: ICD-10-CM

## 2024-02-16 DIAGNOSIS — R55 SYNCOPE AND COLLAPSE: ICD-10-CM

## 2024-02-23 ENCOUNTER — HOSPITAL ENCOUNTER (OUTPATIENT)
Dept: CARDIOLOGY | Facility: HOSPITAL | Age: 64
Discharge: HOME | End: 2024-02-23
Payer: COMMERCIAL

## 2024-02-23 DIAGNOSIS — R55 SYNCOPE AND COLLAPSE: ICD-10-CM

## 2024-02-23 DIAGNOSIS — Z95.818 PRESENCE OF OTHER CARDIAC IMPLANTS AND GRAFTS: ICD-10-CM

## 2024-02-23 PROCEDURE — 93298 REM INTERROG DEV EVAL SCRMS: CPT

## 2024-02-23 PROCEDURE — 93298 REM INTERROG DEV EVAL SCRMS: CPT | Performed by: INTERNAL MEDICINE

## 2024-03-08 ENCOUNTER — HOSPITAL ENCOUNTER (OUTPATIENT)
Dept: CARDIOLOGY | Facility: HOSPITAL | Age: 64
Discharge: HOME | End: 2024-03-08
Payer: COMMERCIAL

## 2024-03-08 DIAGNOSIS — R55 SYNCOPE AND COLLAPSE: ICD-10-CM

## 2024-03-08 DIAGNOSIS — Z95.818 PRESENCE OF OTHER CARDIAC IMPLANTS AND GRAFTS: ICD-10-CM

## 2024-03-12 ENCOUNTER — APPOINTMENT (OUTPATIENT)
Dept: PRIMARY CARE | Facility: CLINIC | Age: 64
End: 2024-03-12
Payer: COMMERCIAL

## 2024-03-15 ENCOUNTER — HOSPITAL ENCOUNTER (OUTPATIENT)
Dept: CARDIOLOGY | Facility: HOSPITAL | Age: 64
Discharge: HOME | End: 2024-03-15
Payer: COMMERCIAL

## 2024-03-15 DIAGNOSIS — R55 SYNCOPE AND COLLAPSE: ICD-10-CM

## 2024-03-15 DIAGNOSIS — Z95.818 PRESENCE OF OTHER CARDIAC IMPLANTS AND GRAFTS: ICD-10-CM

## 2024-03-22 ENCOUNTER — HOSPITAL ENCOUNTER (OUTPATIENT)
Dept: CARDIOLOGY | Facility: HOSPITAL | Age: 64
Discharge: HOME | End: 2024-03-22
Payer: COMMERCIAL

## 2024-03-22 DIAGNOSIS — Z95.818 PRESENCE OF OTHER CARDIAC IMPLANTS AND GRAFTS: ICD-10-CM

## 2024-03-22 DIAGNOSIS — R55 SYNCOPE AND COLLAPSE: ICD-10-CM

## 2024-03-25 ENCOUNTER — TELEPHONE (OUTPATIENT)
Dept: PRIMARY CARE | Facility: CLINIC | Age: 64
End: 2024-03-25
Payer: COMMERCIAL

## 2024-03-25 DIAGNOSIS — S20.212S CONTUSION OF LEFT CHEST WALL, SEQUELA: Primary | ICD-10-CM

## 2024-03-25 DIAGNOSIS — R07.81 RIB PAIN ON LEFT SIDE: ICD-10-CM

## 2024-03-25 NOTE — TELEPHONE ENCOUNTER
Patient fell 2 weeks ago- asking to have xray order for rib pain.  [phone]    Patient c/o LEFT side rib pain.

## 2024-03-27 ENCOUNTER — HOSPITAL ENCOUNTER (OUTPATIENT)
Dept: RADIOLOGY | Facility: HOSPITAL | Age: 64
Discharge: HOME | End: 2024-03-27
Payer: COMMERCIAL

## 2024-03-27 DIAGNOSIS — S20.212S CONTUSION OF LEFT CHEST WALL, SEQUELA: ICD-10-CM

## 2024-03-27 DIAGNOSIS — R07.81 RIB PAIN ON LEFT SIDE: ICD-10-CM

## 2024-03-27 PROCEDURE — 71101 X-RAY EXAM UNILAT RIBS/CHEST: CPT | Mod: LT

## 2024-03-27 PROCEDURE — 71101 X-RAY EXAM UNILAT RIBS/CHEST: CPT | Mod: LEFT SIDE | Performed by: STUDENT IN AN ORGANIZED HEALTH CARE EDUCATION/TRAINING PROGRAM

## 2024-03-29 ENCOUNTER — HOSPITAL ENCOUNTER (OUTPATIENT)
Dept: CARDIOLOGY | Facility: HOSPITAL | Age: 64
Discharge: HOME | End: 2024-03-29
Payer: COMMERCIAL

## 2024-03-29 DIAGNOSIS — Z95.818 PRESENCE OF OTHER CARDIAC IMPLANTS AND GRAFTS: ICD-10-CM

## 2024-03-29 DIAGNOSIS — R55 SYNCOPE AND COLLAPSE: ICD-10-CM

## 2024-03-29 PROCEDURE — 93298 REM INTERROG DEV EVAL SCRMS: CPT

## 2024-03-29 PROCEDURE — 93298 REM INTERROG DEV EVAL SCRMS: CPT | Performed by: INTERNAL MEDICINE

## 2024-04-04 ENCOUNTER — OFFICE VISIT (OUTPATIENT)
Dept: PRIMARY CARE | Facility: CLINIC | Age: 64
End: 2024-04-04
Payer: COMMERCIAL

## 2024-04-04 VITALS
BODY MASS INDEX: 27 KG/M2 | HEIGHT: 67 IN | DIASTOLIC BLOOD PRESSURE: 75 MMHG | SYSTOLIC BLOOD PRESSURE: 108 MMHG | WEIGHT: 172 LBS | HEART RATE: 68 BPM

## 2024-04-04 DIAGNOSIS — R73.9 BORDERLINE HYPERGLYCEMIA: ICD-10-CM

## 2024-04-04 DIAGNOSIS — R55 SYNCOPE, UNSPECIFIED SYNCOPE TYPE: ICD-10-CM

## 2024-04-04 DIAGNOSIS — I47.10 PAROXYSMAL SUPRAVENTRICULAR TACHYCARDIA (CMS-HCC): ICD-10-CM

## 2024-04-04 DIAGNOSIS — Z91.89 FRAMINGHAM CARDIAC RISK <10% IN NEXT 10 YEARS: ICD-10-CM

## 2024-04-04 DIAGNOSIS — E55.9 VITAMIN D DEFICIENCY: ICD-10-CM

## 2024-04-04 DIAGNOSIS — F33.0 MILD RECURRENT MAJOR DEPRESSION (CMS-HCC): ICD-10-CM

## 2024-04-04 DIAGNOSIS — E78.2 MIXED HYPERLIPIDEMIA: ICD-10-CM

## 2024-04-04 DIAGNOSIS — M35.01 KERATITIS SICCA, BILATERAL (MULTI): ICD-10-CM

## 2024-04-04 DIAGNOSIS — I49.3 PREMATURE VENTRICULAR CONTRACTIONS: ICD-10-CM

## 2024-04-04 PROCEDURE — 3008F BODY MASS INDEX DOCD: CPT | Performed by: INTERNAL MEDICINE

## 2024-04-04 PROCEDURE — 99213 OFFICE O/P EST LOW 20 MIN: CPT | Performed by: INTERNAL MEDICINE

## 2024-04-04 PROCEDURE — 1036F TOBACCO NON-USER: CPT | Performed by: INTERNAL MEDICINE

## 2024-04-04 RX ORDER — LANOLIN ALCOHOL/MO/W.PET/CERES
400 CREAM (GRAM) TOPICAL DAILY
Status: SHIPPED | COMMUNITY
Start: 2024-04-04

## 2024-04-04 NOTE — PATIENT INSTRUCTIONS
Thank you very much for coming.  I am very happy to see you.    I do understand where Dr. Brown is coming from when he sounds the alarm about your CHOLESTEROL.  Your LDL cholesterol is above 190.  The goal is to have it 160 or less.  You can easily achieve this by taking your medications as he has recommended.  I bet you are already there.    Please continue taking your ROSUVASTATIN at bedtime.  Likewise, with your history of loss of consciousness and rhythm challenges, please continue taking your MAGNESIUM.  I will check your levels again in 2 months.    In fact, please come back in June.  Do some FASTING laboratory examinations via [facility]/ at the [facility], then see me soon after.  We can do your COMPLETE physical examination early this year and have it done in June instead of August, your birth month.    Until then, please continue to take care of yourself, and please continue to pray for our recovery from this pandemic.  Please continue to be careful.  Please return to day shift!    I hope you had a good Easter Sunday.  Please be careful on solar eclipse day!            0  Return in 2 months.  40 minutes please.  Repeat FASTING laboratory examination via [facility], then see me for COMPLETE physical examination for year 2024.  Reassess mood, energy, function, review preventive strategies, cardiovascular risk.  Coordinate with cardiology, EP, urology, nephrology, orthopedics, neurology, gynecology, as patient is seen.            0

## 2024-04-04 NOTE — PROGRESS NOTES
Subjective   Patient ID: Salina Gordon is a 63 y.o. female who presents for Annual Exam (Complete Physical Exam).    HPI   The patient is here for review of hemodynamics, cardiovascular risk, preventive strategies.  Seen by CARDIOLOGY, with laboratory results reviewed by Dr. Brown.  The patient has since been compliant with medications, tolerating regimens.  Physically active.  Eating sensibly.  No bc chest pain.  No orthopnea, no paroxysmal nocturnal dyspnea.  No dizziness, diaphoresis, palpitations.  No loss of consciousness.  No bipedal edema.  No remarkable dyspnea on exertion.  No headache, blurred vision, diplopia.  No dysphagia.  No focal weakness, ataxia, clumsiness.  No falls.  No paresthesia.  No confusion or delirium, with patient not worried about memory.  Not depressive or suicidal, with patient not wishing harm to self or others.  No particular cough, no particular sputum production.  CONSTITUTIONALLY, no fever, no chills.  No night sweats.  No lingering anorexia or nausea.  No apparent lymphadenopathy.  No apparent weight loss.    Recently switched to second shift, but soon realizing that this was harder on her sleep-wake schedule.  Plans to return to days.  Appetite preserved, with no nausea, vomiting, abdominal distress.  No diarrhea, no constipation.  No apparent blood in stool.  No apparent weight loss.  No dysuria, flank, suprapubic pain.  No discharge, no pruritus.  No rash.  No malodor.  No hesitancy, no feeling of incomplete voiding.  No skin changes, rashes, pruritus, jaundice.  No easy bruisability.  ENDOCRINE with no polyuria, polydipsia, polyphagia.  No blurred vision.  No skin, hair, nail changes.  No dramatic weight loss or weight gain.      Contusion, left hemithorax, about 2 weeks ago.  Some pleuritic component perhaps, finally abating today.  Patient states that she was bruised, but this has also since resolved.        Review of Systems  Review of systems as in history of present  "illness, and otherwise, reviewed separately as well, and was unremarkable/negative/noncontributory.        Objective   /75 (BP Location: Left arm, Patient Position: Sitting, BP Cuff Size: Adult)   Pulse 68   Ht 1.702 m (5' 7\")   Wt 78 kg (172 lb)   BMI 26.94 kg/m²     Physical Exam  In good spirits.  Not in distress or diaphoresis.  Alert, oriented x 3.  Amiable.  Not unkempt.  Moderately built.  Receptive, cheerful, appropriate, and eager to maintain and perhaps even improve quality of life.  Does not wish harm to self or others.    HEAD pink palpebral conjunctivae, anicteric sclerae.  Mucous membranes moist.  NECK supple, no apparent jugular venous distention.  No carotid bruit.  CARDIOVASCULAR not in distress or diaphoresis.  No bipedal edema.  Regular rate and rhythm.  No murmurs appreciated.  LUNGS not in distress or diaphoresis.  Not using accessory muscles.  Clear to auscultation bilaterally.  CHEST wall with currently no crepitus, no pinpoint tenderness, and no residual bruising.  ABDOMEN soft, nontender.  BACK no costovertebral angle tenderness.  EXTREMITIES no clubbing, no cyanosis.  NEURO no facial asymmetry.  No apparent cranial nerve deficits.  Romberg negative.  Ambulating without need of assistance.  No apparent focal weakness.  No tremors.  PSYCH receptive, appropriate, and eager to maintain and improve quality of life.        LABORATORY results reviewed with the patient.        Assessment/Plan   Diagnoses and all orders for this visit:  Paroxysmal supraventricular tachycardia (CMS/HCC)  -     Follow Up In Primary Care - Established; Future  Premature ventricular contractions  -     Follow Up In Primary Care - Established; Future  Syncope, unspecified syncope type  -     Follow Up In Primary Care - Established; Future  Mixed hyperlipidemia  -     Follow Up In Primary Care - Established  -     Follow Up In Primary Care - Established; Future  Borderline hyperglycemia  -     Follow Up In " Primary Care - Established; Future  [place] cardiac risk <10% in next 10 years  Comments:  3.3% 12/23  Orders:  -     Follow Up In Primary Care - Established; Future  Mild recurrent major depression (CMS/HCC)  -     Follow Up In Primary Care - Established; Future  Vitamin D deficiency  -     Follow Up In Primary Care - Established; Future  Keratitis sicca, bilateral (CMS/HCC)  -     Follow Up In Primary Care - Established; Future       Thank you very much for coming.  I am very happy to see you.    I do understand where Dr. Brown is coming from when he sounds the alarm about your CHOLESTEROL.  Your LDL cholesterol is above 190.  The goal is to have it 160 or less.  You can easily achieve this by taking your medications as he has recommended.  I bet you are already there.    Please continue taking your ROSUVASTATIN at bedtime.  Likewise, with your history of loss of consciousness and rhythm challenges, please continue taking your MAGNESIUM.  I will check your levels again in 2 months.    In fact, please come back in June.  Do some FASTING laboratory examinations via [facility]/ at the [facility], then see me soon after.  We can do your COMPLETE physical examination early this year and have it done in June instead of August, your birth month.    Until then, please continue to take care of yourself, and please continue to pray for our recovery from this pandemic.  Please continue to be careful.  Please return to day shift!    I hope you had a good Easter Sunday.  Please be careful on solar eclipse day!            0  Return in 2 months.  40 minutes please.  Repeat FASTING laboratory examination via [facility], then see me for COMPLETE physical examination for year 2024.  Reassess mood, energy, function, review preventive strategies, cardiovascular risk.  Coordinate with cardiology, EP, urology, nephrology, orthopedics, neurology, gynecology, as patient is seen.            0

## 2024-04-05 ENCOUNTER — HOSPITAL ENCOUNTER (OUTPATIENT)
Dept: CARDIOLOGY | Facility: HOSPITAL | Age: 64
Discharge: HOME | End: 2024-04-05
Payer: COMMERCIAL

## 2024-04-05 DIAGNOSIS — R55 SYNCOPE AND COLLAPSE: ICD-10-CM

## 2024-04-05 DIAGNOSIS — Z95.818 PRESENCE OF OTHER CARDIAC IMPLANTS AND GRAFTS: ICD-10-CM

## 2024-04-12 ENCOUNTER — HOSPITAL ENCOUNTER (OUTPATIENT)
Dept: CARDIOLOGY | Facility: HOSPITAL | Age: 64
Discharge: HOME | End: 2024-04-12
Payer: COMMERCIAL

## 2024-04-12 DIAGNOSIS — R55 SYNCOPE AND COLLAPSE: ICD-10-CM

## 2024-04-12 DIAGNOSIS — Z95.818 PRESENCE OF OTHER CARDIAC IMPLANTS AND GRAFTS: ICD-10-CM

## 2024-04-19 ENCOUNTER — HOSPITAL ENCOUNTER (OUTPATIENT)
Dept: CARDIOLOGY | Facility: HOSPITAL | Age: 64
Discharge: HOME | End: 2024-04-19
Payer: COMMERCIAL

## 2024-04-19 DIAGNOSIS — Z95.818 PRESENCE OF OTHER CARDIAC IMPLANTS AND GRAFTS: ICD-10-CM

## 2024-04-19 DIAGNOSIS — R55 SYNCOPE AND COLLAPSE: ICD-10-CM

## 2024-05-03 ENCOUNTER — HOSPITAL ENCOUNTER (OUTPATIENT)
Dept: CARDIOLOGY | Facility: HOSPITAL | Age: 64
Discharge: HOME | End: 2024-05-03
Payer: COMMERCIAL

## 2024-05-03 DIAGNOSIS — Z95.818 STATUS POST PLACEMENT OF IMPLANTABLE LOOP RECORDER: ICD-10-CM

## 2024-05-03 PROCEDURE — 93298 REM INTERROG DEV EVAL SCRMS: CPT

## 2024-05-03 PROCEDURE — 93298 REM INTERROG DEV EVAL SCRMS: CPT | Performed by: INTERNAL MEDICINE

## 2024-05-10 ENCOUNTER — HOSPITAL ENCOUNTER (OUTPATIENT)
Dept: CARDIOLOGY | Facility: HOSPITAL | Age: 64
Discharge: HOME | End: 2024-05-10
Payer: COMMERCIAL

## 2024-05-10 DIAGNOSIS — Z95.818 STATUS POST PLACEMENT OF IMPLANTABLE LOOP RECORDER: ICD-10-CM

## 2024-05-17 ENCOUNTER — HOSPITAL ENCOUNTER (OUTPATIENT)
Dept: CARDIOLOGY | Facility: HOSPITAL | Age: 64
Discharge: HOME | End: 2024-05-17
Payer: COMMERCIAL

## 2024-05-17 DIAGNOSIS — Z95.818 STATUS POST PLACEMENT OF IMPLANTABLE LOOP RECORDER: ICD-10-CM

## 2024-06-04 ENCOUNTER — TELEPHONE (OUTPATIENT)
Dept: PRIMARY CARE | Facility: CLINIC | Age: 64
End: 2024-06-04
Payer: COMMERCIAL

## 2024-06-04 DIAGNOSIS — R73.9 BORDERLINE HYPERGLYCEMIA: Primary | ICD-10-CM

## 2024-06-04 DIAGNOSIS — I49.3 PREMATURE VENTRICULAR CONTRACTIONS: ICD-10-CM

## 2024-06-04 DIAGNOSIS — E55.9 VITAMIN D DEFICIENCY: ICD-10-CM

## 2024-06-04 DIAGNOSIS — E78.2 MIXED HYPERLIPIDEMIA: ICD-10-CM

## 2024-06-04 DIAGNOSIS — I47.10 PAROXYSMAL SUPRAVENTRICULAR TACHYCARDIA (CMS-HCC): ICD-10-CM

## 2024-06-04 NOTE — TELEPHONE ENCOUNTER
Patient left message, that she will be getting blood work tomorrow, and that   Her LAB ORDER hasn't been placed.

## 2024-06-06 ENCOUNTER — APPOINTMENT (OUTPATIENT)
Dept: PRIMARY CARE | Facility: CLINIC | Age: 64
End: 2024-06-06
Payer: COMMERCIAL

## 2024-06-06 ENCOUNTER — TELEPHONE (OUTPATIENT)
Dept: PRIMARY CARE | Facility: CLINIC | Age: 64
End: 2024-06-06

## 2024-06-19 ENCOUNTER — LAB (OUTPATIENT)
Dept: LAB | Facility: LAB | Age: 64
End: 2024-06-19
Payer: COMMERCIAL

## 2024-06-19 ENCOUNTER — TELEPHONE (OUTPATIENT)
Dept: CARDIOLOGY | Facility: CLINIC | Age: 64
End: 2024-06-19

## 2024-06-19 DIAGNOSIS — I47.10 PAROXYSMAL SUPRAVENTRICULAR TACHYCARDIA (CMS-HCC): ICD-10-CM

## 2024-06-19 DIAGNOSIS — E55.9 VITAMIN D DEFICIENCY: ICD-10-CM

## 2024-06-19 DIAGNOSIS — E78.2 MIXED HYPERLIPIDEMIA: ICD-10-CM

## 2024-06-19 DIAGNOSIS — R73.9 BORDERLINE HYPERGLYCEMIA: ICD-10-CM

## 2024-06-19 DIAGNOSIS — Z95.818 PRESENCE OF OTHER CARDIAC IMPLANTS AND GRAFTS: Primary | ICD-10-CM

## 2024-06-19 DIAGNOSIS — I49.3 PREMATURE VENTRICULAR CONTRACTIONS: ICD-10-CM

## 2024-06-19 LAB
25(OH)D3 SERPL-MCNC: 43 NG/ML (ref 30–100)
ALBUMIN SERPL BCP-MCNC: 4.2 G/DL (ref 3.4–5)
ALP SERPL-CCNC: 83 U/L (ref 33–136)
ALT SERPL W P-5'-P-CCNC: 26 U/L (ref 7–45)
ANION GAP SERPL CALC-SCNC: 12 MMOL/L (ref 10–20)
APPEARANCE UR: CLEAR
AST SERPL W P-5'-P-CCNC: 25 U/L (ref 9–39)
BASOPHILS # BLD AUTO: 0.04 X10*3/UL (ref 0–0.1)
BASOPHILS NFR BLD AUTO: 0.7 %
BILIRUB SERPL-MCNC: 0.6 MG/DL (ref 0–1.2)
BILIRUB UR STRIP.AUTO-MCNC: NEGATIVE MG/DL
BUN SERPL-MCNC: 16 MG/DL (ref 6–23)
CALCIUM SERPL-MCNC: 9 MG/DL (ref 8.6–10.3)
CHLORIDE SERPL-SCNC: 107 MMOL/L (ref 98–107)
CHOLEST SERPL-MCNC: 200 MG/DL (ref 0–199)
CHOLESTEROL/HDL RATIO: 3.9
CO2 SERPL-SCNC: 26 MMOL/L (ref 21–32)
COLOR UR: COLORLESS
CREAT SERPL-MCNC: 0.82 MG/DL (ref 0.5–1.05)
EGFRCR SERPLBLD CKD-EPI 2021: 81 ML/MIN/1.73M*2
EOSINOPHIL # BLD AUTO: 0.18 X10*3/UL (ref 0–0.7)
EOSINOPHIL NFR BLD AUTO: 2.9 %
ERYTHROCYTE [DISTWIDTH] IN BLOOD BY AUTOMATED COUNT: 13.5 % (ref 11.5–14.5)
EST. AVERAGE GLUCOSE BLD GHB EST-MCNC: 105 MG/DL
GLUCOSE SERPL-MCNC: 77 MG/DL (ref 74–99)
GLUCOSE UR STRIP.AUTO-MCNC: NORMAL MG/DL
HBA1C MFR BLD: 5.3 %
HCT VFR BLD AUTO: 39.1 % (ref 36–46)
HDLC SERPL-MCNC: 50.8 MG/DL
HGB BLD-MCNC: 12.9 G/DL (ref 12–16)
IMM GRANULOCYTES # BLD AUTO: 0.01 X10*3/UL (ref 0–0.7)
IMM GRANULOCYTES NFR BLD AUTO: 0.2 % (ref 0–0.9)
KETONES UR STRIP.AUTO-MCNC: NEGATIVE MG/DL
LDLC SERPL CALC-MCNC: 116 MG/DL
LEUKOCYTE ESTERASE UR QL STRIP.AUTO: NEGATIVE
LYMPHOCYTES # BLD AUTO: 2.23 X10*3/UL (ref 1.2–4.8)
LYMPHOCYTES NFR BLD AUTO: 36.4 %
MAGNESIUM SERPL-MCNC: 1.89 MG/DL (ref 1.6–2.4)
MCH RBC QN AUTO: 30.4 PG (ref 26–34)
MCHC RBC AUTO-ENTMCNC: 33 G/DL (ref 32–36)
MCV RBC AUTO: 92 FL (ref 80–100)
MONOCYTES # BLD AUTO: 0.57 X10*3/UL (ref 0.1–1)
MONOCYTES NFR BLD AUTO: 9.3 %
NEUTROPHILS # BLD AUTO: 3.09 X10*3/UL (ref 1.2–7.7)
NEUTROPHILS NFR BLD AUTO: 50.5 %
NITRITE UR QL STRIP.AUTO: NEGATIVE
NON HDL CHOLESTEROL: 149 MG/DL (ref 0–149)
NRBC BLD-RTO: 0 /100 WBCS (ref 0–0)
PH UR STRIP.AUTO: 5 [PH]
PLATELET # BLD AUTO: 277 X10*3/UL (ref 150–450)
POTASSIUM SERPL-SCNC: 4.7 MMOL/L (ref 3.5–5.3)
PROT SERPL-MCNC: 6.3 G/DL (ref 6.4–8.2)
PROT UR STRIP.AUTO-MCNC: NEGATIVE MG/DL
RBC # BLD AUTO: 4.24 X10*6/UL (ref 4–5.2)
RBC # UR STRIP.AUTO: NEGATIVE /UL
SODIUM SERPL-SCNC: 140 MMOL/L (ref 136–145)
SP GR UR STRIP.AUTO: 1
TRIGL SERPL-MCNC: 166 MG/DL (ref 0–149)
TSH SERPL-ACNC: 2.56 MIU/L (ref 0.44–3.98)
UROBILINOGEN UR STRIP.AUTO-MCNC: NORMAL MG/DL
VLDL: 33 MG/DL (ref 0–40)
WBC # BLD AUTO: 6.1 X10*3/UL (ref 4.4–11.3)

## 2024-06-19 PROCEDURE — 82306 VITAMIN D 25 HYDROXY: CPT

## 2024-06-19 PROCEDURE — 84443 ASSAY THYROID STIM HORMONE: CPT

## 2024-06-19 PROCEDURE — 80061 LIPID PANEL: CPT

## 2024-06-19 PROCEDURE — 81003 URINALYSIS AUTO W/O SCOPE: CPT | Performed by: INTERNAL MEDICINE

## 2024-06-19 PROCEDURE — 85025 COMPLETE CBC W/AUTO DIFF WBC: CPT

## 2024-06-19 PROCEDURE — 36415 COLL VENOUS BLD VENIPUNCTURE: CPT

## 2024-06-19 PROCEDURE — 83036 HEMOGLOBIN GLYCOSYLATED A1C: CPT

## 2024-06-19 PROCEDURE — 80053 COMPREHEN METABOLIC PANEL: CPT

## 2024-06-19 PROCEDURE — 83735 ASSAY OF MAGNESIUM: CPT

## 2024-06-19 NOTE — TELEPHONE ENCOUNTER
Patient called and LM stating she thinks her loop recorder battery is dead. Called patient back and she stated she has had it for 2 years. She looked at device check results from 5/12/24 and battery was very low. Has not been able to pair device with new phone since then. Routed to Adriana Mendoza RN

## 2024-06-20 LAB — HOLD SPECIMEN: NORMAL

## 2024-06-20 NOTE — TELEPHONE ENCOUNTER
Left message for patient. I will route to scheduling to move up patient's appointment with Dr. Wiggins and schedule for in clinic device check to ensure battery is at end of life and not just a connection issue with the new phone. Order placed for in clinic device check and sent to physician for signature.

## 2024-06-21 ENCOUNTER — APPOINTMENT (OUTPATIENT)
Dept: CARDIOLOGY | Facility: HOSPITAL | Age: 64
End: 2024-06-21
Payer: COMMERCIAL

## 2024-06-25 ENCOUNTER — HOSPITAL ENCOUNTER (OUTPATIENT)
Dept: CARDIOLOGY | Facility: HOSPITAL | Age: 64
Discharge: HOME | End: 2024-06-25
Payer: COMMERCIAL

## 2024-06-25 ENCOUNTER — APPOINTMENT (OUTPATIENT)
Dept: CARDIOLOGY | Facility: CLINIC | Age: 64
End: 2024-06-25
Payer: COMMERCIAL

## 2024-06-25 VITALS
HEART RATE: 65 BPM | BODY MASS INDEX: 26.68 KG/M2 | SYSTOLIC BLOOD PRESSURE: 122 MMHG | DIASTOLIC BLOOD PRESSURE: 70 MMHG | WEIGHT: 170 LBS | HEIGHT: 67 IN

## 2024-06-25 DIAGNOSIS — I47.10 PAROXYSMAL SUPRAVENTRICULAR TACHYCARDIA (CMS-HCC): ICD-10-CM

## 2024-06-25 DIAGNOSIS — Z71.89 ENCOUNTER TO DISCUSS TREATMENT OPTIONS: ICD-10-CM

## 2024-06-25 DIAGNOSIS — Z71.89 ENCOUNTER FOR MEDICATION REVIEW AND COUNSELING: ICD-10-CM

## 2024-06-25 DIAGNOSIS — Z95.818 PRESENCE OF OTHER CARDIAC IMPLANTS AND GRAFTS: ICD-10-CM

## 2024-06-25 DIAGNOSIS — R55 SYNCOPE, UNSPECIFIED SYNCOPE TYPE: ICD-10-CM

## 2024-06-25 DIAGNOSIS — Z95.818 STATUS POST PLACEMENT OF IMPLANTABLE LOOP RECORDER: ICD-10-CM

## 2024-06-25 DIAGNOSIS — I49.3 PREMATURE VENTRICULAR CONTRACTIONS: ICD-10-CM

## 2024-06-25 DIAGNOSIS — E78.2 MIXED HYPERLIPIDEMIA: ICD-10-CM

## 2024-06-25 DIAGNOSIS — Z78.9 NEVER SMOKED CIGARETTES: ICD-10-CM

## 2024-06-25 DIAGNOSIS — R00.1 SINUS BRADYCARDIA: Primary | ICD-10-CM

## 2024-06-25 PROCEDURE — 3008F BODY MASS INDEX DOCD: CPT | Performed by: INTERNAL MEDICINE

## 2024-06-25 PROCEDURE — 93000 ELECTROCARDIOGRAM COMPLETE: CPT | Mod: DISTINCT PROCEDURAL SERVICE | Performed by: INTERNAL MEDICINE

## 2024-06-25 PROCEDURE — 1036F TOBACCO NON-USER: CPT | Performed by: INTERNAL MEDICINE

## 2024-06-25 PROCEDURE — 99215 OFFICE O/P EST HI 40 MIN: CPT | Performed by: INTERNAL MEDICINE

## 2024-06-25 PROCEDURE — 93291 INTERROG DEV EVAL SCRMS IP: CPT | Performed by: INTERNAL MEDICINE

## 2024-06-25 PROCEDURE — 93291 INTERROG DEV EVAL SCRMS IP: CPT

## 2024-06-25 RX ORDER — MUPIROCIN 20 MG/G
1 OINTMENT TOPICAL ONCE
OUTPATIENT
Start: 2024-06-25 | End: 2024-06-25

## 2024-06-25 RX ORDER — CHLORHEXIDINE GLUCONATE 40 MG/ML
SOLUTION TOPICAL ONCE
OUTPATIENT
Start: 2024-06-25 | End: 2024-06-25

## 2024-06-25 RX ORDER — SODIUM CHLORIDE 9 MG/ML
100 INJECTION, SOLUTION INTRAVENOUS CONTINUOUS
OUTPATIENT
Start: 2024-06-25

## 2024-06-25 ASSESSMENT — ENCOUNTER SYMPTOMS
PALPITATIONS: 0
DYSPNEA ON EXERTION: 0

## 2024-06-25 NOTE — PROGRESS NOTES
"Chief Complaint:   Follow-up (Device check follow up)     History Of Present Illness:    Sailna Gordon is a 63 y.o. female presenting with followup.  She has rare palpitation.  We discussed that device is near replacement.     Last Recorded Vitals:  Vitals:    06/25/24 1121   BP: 122/70   BP Location: Left arm   Patient Position: Sitting   Pulse: 65   Weight: 77.1 kg (170 lb)   Height: 1.702 m (5' 7\")       Past Medical History:  See List     Past Surgical History:  See List       Social History:  She reports that she has never smoked. She has never used smokeless tobacco. She reports current alcohol use. She reports that she does not use drugs.    Family History:  Family History   Problem Relation Name Age of Onset    Hypertension Mother      Hyperlipidemia Mother      Other (age related macular degeneration) Mother      Cataracts Mother      Cataracts Father      COPD Father      Glaucoma Father      Leukemia Brother      Diabetes Other grandmother     Lymphoma Other grandmother         Allergies:  Patient has no known allergies.    Outpatient Medications:  Current Outpatient Medications   Medication Instructions    acyclovir (ZOVIRAX) 400 mg, oral, 3 times daily PRN    calcium carbonate-vitamin D3 1,000 mg-20 mcg (800 unit) tablet 1 tablet, oral, Daily    cholecalciferol (Vitamin D-3) 25 MCG (1000 UT) tablet 1 tablet, oral, Daily    fexofenadine (ALLEGRA) 180 mg, oral, Daily with evening meal    gabapentin (Neurontin) 300 mg capsule 1 capsule, oral, Daily    magnesium oxide (MAG-OX) 400 mg, oral, Daily    meloxicam (Mobic) 15 mg tablet 1 tablet, oral, Daily PRN    omeprazole OTC (PriLOSEC OTC) 20 mg EC tablet 1 tablet, oral, Daily    rosuvastatin (CRESTOR) 20 mg, oral, Daily    sertraline (ZOLOFT) 50 mg, oral, Daily    triamcinolone (Kenalog) 0.1 % ointment Apply thin layer topically to affected area(s) every 8 hours. Do not cover with bandage.   Review of Systems   Cardiovascular:  Negative for chest pain, " dyspnea on exertion and palpitations.   All other systems reviewed and are negative.        Physical Exam:  Constitutional:       General: Awake.      Appearance: Normal and healthy appearance. Well-developed and not in distress.   Neck:      Vascular: No JVR. JVD normal.   Pulmonary:      Effort: Pulmonary effort is normal.      Breath sounds: Normal breath sounds. No wheezing. No rhonchi. No rales.   Chest:      Chest wall: Not tender to palpatation.      Comments: Loop recorder in place  Cardiovascular:      PMI at left midclavicular line. Normal rate. Regular rhythm. Normal S1. Normal S2.       Murmurs: There is no murmur.      No gallop.  No click. No rub.   Pulses:     Intact distal pulses.   Edema:     Peripheral edema absent.   Abdominal:      Tenderness: There is no abdominal tenderness.   Musculoskeletal: Normal range of motion.         General: No tenderness. Skin:     General: Skin is warm and dry.   Neurological:      General: No focal deficit present.      Mental Status: Alert and oriented to person, place and time.            Last Labs:  CBC -  Lab Results   Component Value Date    WBC 6.1 06/19/2024    HGB 12.9 06/19/2024    HCT 39.1 06/19/2024    MCV 92 06/19/2024     06/19/2024       CMP -  Lab Results   Component Value Date    CALCIUM 9.0 06/19/2024    PROT 6.3 (L) 06/19/2024    ALBUMIN 4.2 06/19/2024    AST 25 06/19/2024    ALT 26 06/19/2024    ALKPHOS 83 06/19/2024    BILITOT 0.6 06/19/2024       LIPID PANEL -   Lab Results   Component Value Date    CHOL 200 (H) 06/19/2024    TRIG 166 (H) 06/19/2024    HDL 50.8 06/19/2024    CHHDL 3.9 06/19/2024    LDLF 75 01/25/2023    VLDL 33 06/19/2024    NHDL 149 06/19/2024       RENAL FUNCTION PANEL -   Lab Results   Component Value Date    GLUCOSE 77 06/19/2024     06/19/2024    K 4.7 06/19/2024     06/19/2024    CO2 26 06/19/2024    ANIONGAP 12 06/19/2024    BUN 16 06/19/2024    CREATININE 0.82 06/19/2024    CALCIUM 9.0 06/19/2024     ALBUMIN 4.2 06/19/2024        Lab Results   Component Value Date    HGBA1C 5.3 06/19/2024       Last Cardiology Tests:  ECG:  ECG 12 Lead 12/12/2023  Today. NSR.  PVC's. LAD. Qtc 440 ms    Device check today. [facility] DM 4500.  0 events. PVC's. Battery low.  Cardiac Imaging:  MR CARDIAC RESONANCE IMAGING FOR VELOCITY FLOW MAPPING 06/30/2022        Lab review: I have personally reviewed the laboratory result(s) see above    Assessment/Plan   Diagnoses and all orders for this visit:  Sinus bradycardia  -     ECG 12 lead (Clinic Performed)  -     ECG 12 lead (Clinic Performed)  Paroxysmal supraventricular tachycardia (CMS-HCC)  -     ECG 12 lead (Clinic Performed)  -     ECG 12 lead (Clinic Performed)  Premature ventricular contractions  Mixed hyperlipidemia  Syncope, unspecified syncope type  Never smoked cigarettes  Status post placement of implantable loop recorder  BMI 26.0-26.9,adult  Encounter for medication review and counseling  Encounter to discuss treatment options        Adriana Mendoza RN    Syncope and palpitation. NSVT by history. Isolated PVCs on loop recorder. Cardiac MRI with no infiltrative disease and no evidence of ARVC. LVEF 73% by cardiac MRI. Some symptoms suggestive of vasovagal etiology of syncope. Abnormal tilt table test with vasodepressor and cardioinhibitory response. Status post loop recorder.  [employer] [facility] [employer] DM 4500 loop recorder. Paroxsymal atrial arrhythmias. When device needs to be removed, will plan for new loop recorder placement. Device check reviewed. Preop cardiac evaluation reviewed. Informed consent obtained. All questions answered. Shared decision making. [place] decision tool. When device needs explant, will plan for implant of new device.  Isolated PVCs.  Continue Mg Ox.  S/p remote ablation of AVNRT. 2001. No clinical recurrence by loop recorder evaluations  Hyperlipidemia. On statin. Chronic. Stable.   AHA recommendations for exercise, diet, and behavioral modification  reviewed with pt.     Counseling over 50% visit performed. The patient and I discussed the mechanism of arrhythmia, PVCs, no true atrial fibrillation by loop recorder, parox atrial arrhythmias, magnesium,preop cardiac evaluation, informed consent, indications for and types of medications, discussion if and what medication refills needed, treatment options, risks, benefits, and imponderables. American Heart Association lifestyle changes and behavioral modification discussed. All questions answered in detail. Patient appreciative of care.

## 2024-06-25 NOTE — PATIENT INSTRUCTIONS
Continue same medications/treatment.  Patient educated on proper medication use.  Patient educated on risk factor modification.  Please bring any lab results from other providers/physicians to your next appointment.    Please bring all medicines, vitamins, and herbal supplements with you when you come to the office.    Prescriptions will not be filled unless you are compliant with your follow up appointments or have a follow up appointment scheduled as per instruction of your physician. Refills should be requested at the time of your visit.    SCHEDULE loop recorder explant and implant soon. Obtain labs 1 week prior to procedure.   Follow up 7 days after procedure for wound check    IBETSY RN, AM SCRIBING FOR AND IN THE PRESENCE OF DR. REAGAN SANTOS MD, FACC, FACP, FHRS

## 2024-07-10 DIAGNOSIS — N28.1 RENAL CYST: ICD-10-CM

## 2024-07-10 DIAGNOSIS — N20.0 NEPHROLITHIASIS: ICD-10-CM

## 2024-07-11 ENCOUNTER — HOSPITAL ENCOUNTER (OUTPATIENT)
Dept: RADIOLOGY | Facility: HOSPITAL | Age: 64
Discharge: HOME | End: 2024-07-11
Payer: COMMERCIAL

## 2024-07-11 DIAGNOSIS — N28.1 RENAL CYST: ICD-10-CM

## 2024-07-11 DIAGNOSIS — N20.0 NEPHROLITHIASIS: ICD-10-CM

## 2024-07-11 PROCEDURE — 76770 US EXAM ABDO BACK WALL COMP: CPT

## 2024-07-11 PROCEDURE — 76770 US EXAM ABDO BACK WALL COMP: CPT | Performed by: RADIOLOGY

## 2024-08-02 ENCOUNTER — HOSPITAL ENCOUNTER (OUTPATIENT)
Dept: CARDIOLOGY | Facility: HOSPITAL | Age: 64
Discharge: HOME | End: 2024-08-02
Payer: COMMERCIAL

## 2024-08-02 DIAGNOSIS — Z95.818 PRESENCE OF OTHER CARDIAC IMPLANTS AND GRAFTS: ICD-10-CM

## 2024-08-02 DIAGNOSIS — R55 SYNCOPE AND COLLAPSE: ICD-10-CM

## 2024-08-02 PROCEDURE — 93298 REM INTERROG DEV EVAL SCRMS: CPT

## 2024-08-05 ENCOUNTER — APPOINTMENT (OUTPATIENT)
Dept: PRIMARY CARE | Facility: CLINIC | Age: 64
End: 2024-08-05
Payer: COMMERCIAL

## 2024-08-05 VITALS
OXYGEN SATURATION: 98 % | HEART RATE: 67 BPM | DIASTOLIC BLOOD PRESSURE: 74 MMHG | BODY MASS INDEX: 27 KG/M2 | SYSTOLIC BLOOD PRESSURE: 109 MMHG | HEIGHT: 67 IN | WEIGHT: 172 LBS

## 2024-08-05 DIAGNOSIS — R73.9 BORDERLINE HYPERGLYCEMIA: Primary | ICD-10-CM

## 2024-08-05 DIAGNOSIS — E55.9 VITAMIN D DEFICIENCY: ICD-10-CM

## 2024-08-05 DIAGNOSIS — Z12.11 ENCOUNTER FOR SCREENING FOR MALIGNANT NEOPLASM OF COLON: ICD-10-CM

## 2024-08-05 DIAGNOSIS — E78.2 MIXED HYPERLIPIDEMIA: ICD-10-CM

## 2024-08-05 DIAGNOSIS — Z91.89 FRAMINGHAM CARDIAC RISK <10% IN NEXT 10 YEARS: Chronic | ICD-10-CM

## 2024-08-05 DIAGNOSIS — Z12.31 SCREENING MAMMOGRAM FOR BREAST CANCER: ICD-10-CM

## 2024-08-05 DIAGNOSIS — I47.10 PAROXYSMAL SUPRAVENTRICULAR TACHYCARDIA (CMS-HCC): ICD-10-CM

## 2024-08-05 PROCEDURE — 3008F BODY MASS INDEX DOCD: CPT | Performed by: INTERNAL MEDICINE

## 2024-08-05 PROCEDURE — 99213 OFFICE O/P EST LOW 20 MIN: CPT | Performed by: INTERNAL MEDICINE

## 2024-08-05 PROCEDURE — 1036F TOBACCO NON-USER: CPT | Performed by: INTERNAL MEDICINE

## 2024-08-05 NOTE — PROGRESS NOTES
"Subjective   Patient ID: Salina Gordon is a 63 y.o. female who presents for Follow-up (Patient presented today for a 4 month follow up./).    HPI   Doing well.  No particular complaints.  Compliant with medications, tolerating regimens.  Following closely with cardiology, EP.  No bc substernal chest pain, no orthopnea, no paroxysmal nocturnal dyspnea.  Very much physically active.  Likewise, no headache, blurred vision, diplopia.  No dysphagia.  No particular cough, no particular sputum production.  CONSTITUTIONALLY, no fever, no chills.  No night sweats.  No lingering anorexia or nausea.  No apparent lymphadenopathy.  No apparent weight loss.    Interested in getting vaccination for whooping cough.  Also would like to have her mammogram done, and would like to do another COLOGUARD kit.  Appetite preserved, no abdominal distress.  No dysuria, flank, suprapubic pain.  No skin changes.  ENDOCRINE with no polyuria, polydipsia, polyphagia.  No blurred vision.  No skin, hair, nail changes.  No dramatic weight loss or weight gain.          Review of Systems  Review of systems as in history of present illness, and otherwise, reviewed separately as well, and was unremarkable/negative/noncontributory.        Objective   /74 (BP Location: Left arm, Patient Position: Sitting, BP Cuff Size: Adult)   Pulse 67   Ht 1.702 m (5' 7\")   Wt 78 kg (172 lb)   SpO2 98%   BMI 26.94 kg/m²     Physical Exam  In good spirits.  Not in distress or diaphoresis.  Alert, oriented x 3.  Amiable.  Not unkempt.  Moderately built.  Receptive, cheerful, appropriate, and eager to maintain and hopefully improve quality of life.  Does not wish harm to self or others.    HEAD pink palpebral conjunctivae, anicteric sclerae.  NECK supple, no apparent jugular venous distention.  CARDIOVASCULAR not in distress or diaphoresis.  No bipedal edema.  Regular rate and rhythm.  No murmurs.  LUNGS not in distress or diaphoresis.  Not using accessory " muscles.  Clear to auscultation bilaterally.  ABDOMEN soft, nontender.  BACK no costovertebral angle tenderness.  EXTREMITIES no clubbing, no cyanosis.  NEURO no facial asymmetry.  No apparent cranial nerve deficits.  Romberg negative.  Ambulating without need of assistance.  No apparent focal weakness.  No tremors.  PSYCH receptive, appropriate, and eager to maintain and improve quality of life.        LABORATORY results reviewed, with vitamin D 43, liver and kidney function preserved, no anemia.  Cholesterol panel close to ideal.  ASCVD risk 3.1%.  Hemoglobin A1c 5.3.        Assessment/Plan   Diagnoses and all orders for this visit:  Borderline hyperglycemia  -     Hemoglobin A1C; Future  -     Comprehensive Metabolic Panel; Future  -     Follow Up In Primary Care - Established; Future  Mixed hyperlipidemia  -     Lipid Panel; Future  -     Comprehensive Metabolic Panel; Future  -     Follow Up In Primary Care - Established; Future  Paroxysmal supraventricular tachycardia (CMS-HCC)  -     Comprehensive Metabolic Panel; Future  -     TSH with reflex to Free T4 if abnormal; Future  -     Magnesium; Future  -     Follow Up In Primary Care - Established; Future  [place] cardiac risk <10% in next 10 years  Comments:  3.1% August 2024  Orders:  -     Comprehensive Metabolic Panel; Future  -     Follow Up In Primary Care - Established; Future  Vitamin D deficiency  -     Vitamin D 25-Hydroxy,Total (for eval of Vitamin D levels); Future  -     Comprehensive Metabolic Panel; Future  -     Follow Up In Primary Care - Established; Future  Screening mammogram for breast cancer  -     BI mammo bilateral screening tomosynthesis; Future  -     Follow Up In Primary Care - Established; Future  Encounter for screening for malignant neoplasm of colon  -     Cologuard® colon cancer screening; Future  -     Follow Up In Primary Care - Established; Future       Thank you very much for coming.  I do appreciate your visit, and your  trust.    You will be due for a repeat COLOGUARD KIT October 17, 2024.  You will get the kit in the mail.  Please follow the instructions and call the UPS guide to pick it up.  Do not tell him what is in the box.    You are also due for a MAMMOGRAM anytime soon.    You will benefit from the DIPHTHERIA/PERTUSSIS/TETANUS vaccination anytime soon.  You can get this done from your local [place] pharmacy, or from the [facility] pharmacy.  It should be covered by your insurance.    When that time comes, you will also be due for your INFLUENZA vaccination.    In the meantime, I am very happy to see that you are doing well.  Thank you for taking care of yourself.    You will benefit from repeat FASTING laboratory examinations in December of this year, a few days before your next appointment.  You can have the test done at any  facility, including the [facility].    Please do not hesitate to call with questions or concerns.  Please continue to take care of yourself and your family, and please continue to pray for our recovery from this pandemic.  Thank you for your trust!  Take care and God bless.            0  Return in 4 months.  20 minutes please.  Repeat FASTING laboratory examination, then see me for reevaluation of hemodynamics, cardiovascular risk.  Coordinate with cardiology, EP.  Review preventive strategies, coordinate with urology, nephrology, orthopedics, neurology, possibly gynecology, as patient is seen.  Prepare for winter.            0

## 2024-08-05 NOTE — PATIENT INSTRUCTIONS
Thank you very much for coming.  I do appreciate your visit, and your trust.    You will be due for a repeat COLOGUARD KIT October 17, 2024.  You will get the kit in the mail.  Please follow the instructions and call the UPS guide to pick it up.  Do not tell him what is in the box.    You are also due for a MAMMOGRAM anytime soon.    You will benefit from the DIPHTHERIA/PERTUSSIS/TETANUS vaccination anytime soon.  You can get this done from your local [facility] pharmacy, or from the [facility] pharmacy.  It should be covered by your insurance.    When that time comes, you will also be due for your INFLUENZA vaccination.    In the meantime, I am very happy to see that you are doing well.  Thank you for taking care of yourself.    You will benefit from repeat FASTING laboratory examinations in December of this year, a few days before your next appointment.  You can have the test done at any  facility, including the [facility].    Please do not hesitate to call with questions or concerns.  Please continue to take care of yourself and your family, and please continue to pray for our recovery from this pandemic.  Thank you for your trust!  Take care and God bless.            0  Return in 4 months.  20 minutes please.  Repeat FASTING laboratory examination, then see me for reevaluation of hemodynamics, cardiovascular risk.  Coordinate with cardiology, EP.  Review preventive strategies, coordinate with urology, nephrology, orthopedics, neurology, possibly gynecology, as patient is seen.  Prepare for winter.            0

## 2024-08-13 ENCOUNTER — APPOINTMENT (OUTPATIENT)
Dept: UROLOGY | Facility: CLINIC | Age: 64
End: 2024-08-13
Payer: COMMERCIAL

## 2024-08-13 VITALS
SYSTOLIC BLOOD PRESSURE: 110 MMHG | HEART RATE: 99 BPM | DIASTOLIC BLOOD PRESSURE: 77 MMHG | HEIGHT: 67 IN | RESPIRATION RATE: 18 BRPM | WEIGHT: 172.62 LBS | BODY MASS INDEX: 27.09 KG/M2

## 2024-08-13 DIAGNOSIS — N28.1 RENAL CYST: ICD-10-CM

## 2024-08-13 DIAGNOSIS — N20.0 NEPHROLITHIASIS: Primary | ICD-10-CM

## 2024-08-13 PROCEDURE — G2211 COMPLEX E/M VISIT ADD ON: HCPCS | Performed by: UROLOGY

## 2024-08-13 PROCEDURE — 99214 OFFICE O/P EST MOD 30 MIN: CPT | Performed by: UROLOGY

## 2024-08-13 PROCEDURE — 3008F BODY MASS INDEX DOCD: CPT | Performed by: UROLOGY

## 2024-08-13 PROCEDURE — 1036F TOBACCO NON-USER: CPT | Performed by: UROLOGY

## 2024-08-13 ASSESSMENT — ENCOUNTER SYMPTOMS
DYSURIA: 0
FREQUENCY: 0
HEMATURIA: 0
DIFFICULTY URINATING: 0

## 2024-08-13 ASSESSMENT — PAIN SCALES - GENERAL: PAINLEVEL: 0-NO PAIN

## 2024-08-13 NOTE — PROGRESS NOTES
Provider Impressions     63 year-old white female referred for a left ureteropelvic junction, UPJ, CALCULUS as seen in the emergency room on a renal colic CAT scan. 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 She has no family history of breast or prostate cancer. She has never smoked cigarettes.     12/05/15, IVP reveals a 16 mm left UPJ stone and a 4 cm right SIMPLE RENAL CYST. Patient has intermittent left flank pain. She will be scheduled for ESWL on 12/15/15. Dr. Corbett will provide risk stratification.     12/15/15, OR, CYSTOSCOPY, LEFT RETROPYELOGRAM, PLACEMENT OF LEFT 6 Chinese BY 26 CM DOUBLE-J URETERAL STENT, ESWL OF A 1.8 CM LEFT UPJ STONE.     02/11/16, OR, ESWL OF 2 LEFT RENAL CALCULI MEASURING 1.1 CM AND 0.6 CM.     03/17/16, OR, CYSTOSCOPY, REMOVAL OF LEFT URETERAL STENT, LEFT RETROPYELOGRAM, LEFT URETEROSCOPY     08/16/16, patient has no new complaints. Renal ultrasound shows no stones. Right simple cysts unchanged. She will return in 1 year.     05/22/18, patient had missed her appointment the year before and calls in complaining of bilateral flank pain. Ultrasound is negative.     08/07/18, renal ultrasound is negative for nephrolithiasis. There is a stable right renal cyst. Most probably, the patient's intermittent bilateral flank pain is related to her dehydration and she will address that issue. Recently lost her 94-year-old mother.     09/24/19, patient has no new complaints. Her ultrasound shows normal ultrasound of the kidneys. No hydronephrosis. Text describes a I 0.4 cm simple right renal cyst unchanged. 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 She will return in 1 year.     August 23, 2020, call by telephone. Ultrasound 4.9 cm complex right renal cyst which is stable.     August 17, 2021, BIRTHDAY GIRL, patient arrives alone. She has no new complaints. Her ultrasound does show enlargement of the lobulated 5.6 cm right renal cyst over the past year. We will  continue to follow.     August 11, 2022, Dr. Clark, loop implant     August 16, 2022, patient arrives alone. She is very happy that her sister has moved up with her  from [place] to [facility]. She has no new urologic complaints. Renal ultrasound does not show any stones in the either kidney. The previously identified complex cyst 5.2 cm in the right kidney is unchanged. She will return in 1 year.     September 1, 2022, telephone call, patient interested in donating a kidney. I told her to mention her complex cyst in the right kidney to the transplant team.     August 15, 2023, patient arrives alone. She has no new urologic complaints. Her complex renal cyst in the right kidney has grown slightly from 5.2 to 5.5 cm without morphology or architectural change. No stones seen. She is no longer pursuing donation of the kidney. She will return in 1 year.    August 13, 2024, patient arrives alone.  She has no new urinary complaints.  Her complex renal cyst in the right lower pole of the kidney is 5.5 cm and unchanged.  She will return in 1 year.     PLAN:     #1 the patient will return in August 2025 with a renal ultrasound.      This note was created with voice-recognition software and was not corrected for typographical or grammatical errors

## 2024-08-13 NOTE — PROGRESS NOTES
Patient denies any pain today. Patient has not had any recent surgeries or hospital admits. Patient denies any concerns about falling or safety. Patient has no urinary issues. CV    Review of Systems   Genitourinary:  Negative for decreased urine volume, difficulty urinating, dysuria, enuresis, frequency, hematuria and urgency.   All other systems reviewed and are negative.

## 2024-08-13 NOTE — PATIENT INSTRUCTIONS
Patient Discussion/Summary     It was nice to see you again today. We reviewed your ultrasound which does not show any new stones. The complex cyst on the right side is unchanged at 5.5 cm. I will see you in one year.      This note was created with voice-recognition software and was not corrected for typographical or grammatical errors

## 2024-08-13 NOTE — LETTER
August 13, 2024     Alex Ha MD  [address]   [facility], [address]  [facility] [address]    Patient: Salina Gordon   YOB: 1960   Date of Visit: 8/13/2024       Dear Dr. Alex Ha MD:    Thank you for referring Salina Gordon to me for evaluation. Below are my notes for this consultation.  If you have questions, please do not hesitate to call me. I look forward to following your patient along with you.       Sincerely,     Ming Rodney MD      CC: No Recipients  ______________________________________________________________________________________         Provider Impressions     63 year-old white female referred for a left ureteropelvic junction, UPJ, CALCULUS as seen in the emergency room on a renal colic CAT scan. 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 She has no family history of breast or prostate cancer. She has never smoked cigarettes.     12/05/15, IVP reveals a 16 mm left UPJ stone and a 4 cm right SIMPLE RENAL CYST. Patient has intermittent left flank pain. She will be scheduled for ESWL on 12/15/15. Dr. Corbett will provide risk stratification.     12/15/15, OR, CYSTOSCOPY, LEFT RETROPYELOGRAM, PLACEMENT OF LEFT 6 [place] BY 26 CM DOUBLE-J URETERAL STENT, ESWL OF A 1.8 CM LEFT UPJ STONE.     02/11/16, OR, ESWL OF 2 LEFT RENAL CALCULI MEASURING 1.1 CM AND 0.6 CM.     03/17/16, OR, CYSTOSCOPY, REMOVAL OF LEFT URETERAL STENT, LEFT RETROPYELOGRAM, LEFT URETEROSCOPY     08/16/16, patient has no new complaints. Renal ultrasound shows no stones. Right simple cysts unchanged. She will return in 1 year.     05/22/18, patient had missed her appointment the year before and calls in complaining of bilateral flank pain. Ultrasound is negative.     08/07/18, renal ultrasound is negative for nephrolithiasis. There is a stable right renal cyst. Most probably, the patient's intermittent bilateral flank pain is related to her  dehydration and she will address that issue. Recently lost her 94-year-old mother.     09/24/19, patient has no new complaints. Her ultrasound shows normal ultrasound of the kidneys. No hydronephrosis. Text describes a I 0.4 cm simple right renal cyst unchanged. 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 She will return in 1 year.     August 23, 2020, call by telephone. Ultrasound 4.9 cm complex right renal cyst which is stable.     August 17, 2021, BIRTHDAY GIRL, patient arrives alone. She has no new complaints. Her ultrasound does show enlargement of the lobulated 5.6 cm right renal cyst over the past year. We will continue to follow.     August 11, 2022, Dr. Clark, loop implant     August 16, 2022, patient arrives alone. She is very happy that her sister has moved up with her  from [place] to [facility]. She has no new urologic complaints. Renal ultrasound does not show any stones in the either kidney. The previously identified complex cyst 5.2 cm in the right kidney is unchanged. She will return in 1 year.     September 1, 2022, telephone call, patient interested in donating a kidney. I told her to mention her complex cyst in the right kidney to the transplant team.     August 15, 2023, patient arrives alone. She has no new urologic complaints. Her complex renal cyst in the right kidney has grown slightly from 5.2 to 5.5 cm without morphology or architectural change. No stones seen. She is no longer pursuing donation of the kidney. She will return in 1 year.    August 13, 2024, patient arrives alone.  She has no new urinary complaints.  Her complex renal cyst in the right lower pole of the kidney is 5.5 cm and unchanged.  She will return in 1 year.     PLAN:     #1 the patient will return in August 2025 with a renal ultrasound.      This note was created with voice-recognition software and was not corrected for typographical or grammatical errors

## 2024-08-16 ENCOUNTER — HOSPITAL ENCOUNTER (OUTPATIENT)
Dept: CARDIOLOGY | Facility: HOSPITAL | Age: 64
Discharge: HOME | End: 2024-08-16
Payer: COMMERCIAL

## 2024-08-16 DIAGNOSIS — Z95.818 PRESENCE OF OTHER CARDIAC IMPLANTS AND GRAFTS: ICD-10-CM

## 2024-08-16 DIAGNOSIS — R55 SYNCOPE AND COLLAPSE: ICD-10-CM

## 2024-08-23 ENCOUNTER — HOSPITAL ENCOUNTER (OUTPATIENT)
Dept: CARDIOLOGY | Facility: HOSPITAL | Age: 64
Discharge: HOME | End: 2024-08-23
Payer: COMMERCIAL

## 2024-08-23 DIAGNOSIS — Z95.818 PRESENCE OF OTHER CARDIAC IMPLANTS AND GRAFTS: ICD-10-CM

## 2024-08-23 DIAGNOSIS — R55 SYNCOPE AND COLLAPSE: ICD-10-CM

## 2024-08-30 ENCOUNTER — HOSPITAL ENCOUNTER (OUTPATIENT)
Dept: CARDIOLOGY | Facility: HOSPITAL | Age: 64
Discharge: HOME | End: 2024-08-30
Payer: COMMERCIAL

## 2024-08-30 DIAGNOSIS — R55 SYNCOPE AND COLLAPSE: ICD-10-CM

## 2024-08-30 DIAGNOSIS — Z95.818 PRESENCE OF OTHER CARDIAC IMPLANTS AND GRAFTS: ICD-10-CM

## 2024-09-06 ENCOUNTER — HOSPITAL ENCOUNTER (OUTPATIENT)
Dept: RADIOLOGY | Facility: HOSPITAL | Age: 64
Discharge: HOME | End: 2024-09-06
Payer: COMMERCIAL

## 2024-09-06 DIAGNOSIS — Z12.31 SCREENING MAMMOGRAM FOR BREAST CANCER: ICD-10-CM

## 2024-09-06 PROCEDURE — 77067 SCR MAMMO BI INCL CAD: CPT

## 2024-09-06 PROCEDURE — 77067 SCR MAMMO BI INCL CAD: CPT | Performed by: RADIOLOGY

## 2024-09-06 PROCEDURE — 77063 BREAST TOMOSYNTHESIS BI: CPT | Performed by: RADIOLOGY

## 2024-09-11 ENCOUNTER — TELEPHONE (OUTPATIENT)
Dept: CARDIOLOGY | Facility: CLINIC | Age: 64
End: 2024-09-11
Payer: COMMERCIAL

## 2024-09-11 NOTE — TELEPHONE ENCOUNTER
Per Melania Mims RN,  Device Clinic patient's loop recorder is at End of Service.  I left patient a voicemail message to call the office back to schedule an appointment with Audrey Marcum CNP.

## 2024-09-13 ENCOUNTER — HOSPITAL ENCOUNTER (OUTPATIENT)
Dept: CARDIOLOGY | Facility: HOSPITAL | Age: 64
Discharge: HOME | End: 2024-09-13
Payer: COMMERCIAL

## 2024-09-13 DIAGNOSIS — Z95.818 PRESENCE OF OTHER CARDIAC IMPLANTS AND GRAFTS: ICD-10-CM

## 2024-09-13 DIAGNOSIS — R55 SYNCOPE AND COLLAPSE: ICD-10-CM

## 2024-09-13 PROCEDURE — 93298 REM INTERROG DEV EVAL SCRMS: CPT

## 2024-09-18 ENCOUNTER — APPOINTMENT (OUTPATIENT)
Dept: CARDIOLOGY | Facility: CLINIC | Age: 64
End: 2024-09-18
Payer: COMMERCIAL

## 2024-10-10 ENCOUNTER — OFFICE VISIT (OUTPATIENT)
Dept: CARDIOLOGY | Facility: CLINIC | Age: 64
End: 2024-10-10
Payer: COMMERCIAL

## 2024-10-10 VITALS
HEART RATE: 60 BPM | BODY MASS INDEX: 27.31 KG/M2 | WEIGHT: 174 LBS | HEIGHT: 67 IN | SYSTOLIC BLOOD PRESSURE: 120 MMHG | DIASTOLIC BLOOD PRESSURE: 86 MMHG

## 2024-10-10 DIAGNOSIS — Z95.818 STATUS POST PLACEMENT OF IMPLANTABLE LOOP RECORDER: ICD-10-CM

## 2024-10-10 DIAGNOSIS — Z95.818 IMPLANTABLE LOOP RECORDER PRESENT: Primary | ICD-10-CM

## 2024-10-10 DIAGNOSIS — I49.3 PREMATURE VENTRICULAR CONTRACTIONS: ICD-10-CM

## 2024-10-10 DIAGNOSIS — I47.10 PAROXYSMAL SUPRAVENTRICULAR TACHYCARDIA (CMS-HCC): ICD-10-CM

## 2024-10-10 DIAGNOSIS — Z78.9 NEVER SMOKED CIGARETTES: ICD-10-CM

## 2024-10-10 DIAGNOSIS — E78.2 MIXED HYPERLIPIDEMIA: ICD-10-CM

## 2024-10-10 DIAGNOSIS — R00.1 SINUS BRADYCARDIA: ICD-10-CM

## 2024-10-10 DIAGNOSIS — R55 SYNCOPE, UNSPECIFIED SYNCOPE TYPE: ICD-10-CM

## 2024-10-10 PROCEDURE — 99213 OFFICE O/P EST LOW 20 MIN: CPT | Performed by: NURSE PRACTITIONER

## 2024-10-10 PROCEDURE — 93000 ELECTROCARDIOGRAM COMPLETE: CPT | Performed by: INTERNAL MEDICINE

## 2024-10-10 PROCEDURE — 3008F BODY MASS INDEX DOCD: CPT | Performed by: NURSE PRACTITIONER

## 2024-10-10 PROCEDURE — 1036F TOBACCO NON-USER: CPT | Performed by: NURSE PRACTITIONER

## 2024-10-10 NOTE — PROGRESS NOTES
"CARDIOLOGY OFFICE VISIT      CHIEF COMPLAINT  Chief Complaint   Patient presents with    Abnormal ECG     Pt is here today following up in regards to loop recorder    Chief complaint: \"The device clinic staff told me my battery is no longer working on my loop recorder.\"    HISTORY OF PRESENT ILLNESS  HPI  History: The patient is a 64-year-old  female who is followed for rare palpitations.  She underwent implantation of a loop recorder on August 11, 2022 for correlation of arrhythmia to symptom.  She presents to the office today for follow-up evaluation as the device reached end of service on September 8, 2024.  She states she does have a rare palpitation which usually occurs when she is working.  She states that she is all over the hospital.  She denies chest pain, dizziness, lightheadedness, shortness of breath, abdominal distention, or lower extremity edema.  Past Medical History  Past Medical History:   Diagnosis Date    Personal history of other endocrine, nutritional and metabolic disease     History of hypercholesterolemia    Supraventricular tachycardia (CMS-HCC)     Sustained SVT       Social History  Social History     Tobacco Use    Smoking status: Never    Smokeless tobacco: Never   Substance Use Topics    Alcohol use: Yes     Comment: rare    Drug use: Never       Family History     Family History   Problem Relation Name Age of Onset    Hypertension Mother      Hyperlipidemia Mother      Other (age related macular degeneration) Mother      Cataracts Mother      Cataracts Father      COPD Father      Glaucoma Father      Leukemia Brother      Diabetes Other grandmother     Lymphoma Other grandmother         Allergies:  No Known Allergies     Outpatient Medications:  Current Outpatient Medications   Medication Instructions    acyclovir (ZOVIRAX) 400 mg, oral, 3 times daily PRN    calcium carbonate-vitamin D3 1,000 mg-20 mcg (800 unit) tablet 1 tablet, oral, Daily    cholecalciferol (Vitamin D-3) " 25 MCG (1000 UT) tablet 1 tablet, oral, Daily    fexofenadine (ALLEGRA) 180 mg, oral, Daily with evening meal    gabapentin (Neurontin) 300 mg capsule 1 capsule, oral, Daily    magnesium oxide (MAG-OX) 400 mg, oral, Daily    meloxicam (Mobic) 15 mg tablet 1 tablet, oral, Daily PRN    omeprazole OTC (PriLOSEC OTC) 20 mg EC tablet 1 tablet, oral, Daily    rosuvastatin (CRESTOR) 20 mg, oral, Daily    sertraline (ZOLOFT) 50 mg, oral, Daily    triamcinolone (Kenalog) 0.1 % ointment Apply thin layer topically to affected area(s) every 8 hours. Do not cover with bandage.          REVIEW OF SYSTEMS  Review of Systems   All other systems reviewed and are negative.        VITALS  Vitals:    10/10/24 0859   BP: 120/86   Pulse: 60       PHYSICAL EXAM  Vitals and nursing note reviewed.   Constitutional:       Appearance: Normal appearance.   HENT:      Head: Normocephalic.   Neck:      Vascular: No JVD. Carotid upstrokes II/IV.  Cardiovascular:      Rate and Rhythm: Normal rate and regular rhythm.      Pulses: Normal pulses.      Heart sounds: Normal S1 S2, no S3 S4.  No murmurs or rubs.  Pulmonary:      Effort: Pulmonary effort is normal. Respirations regular and nonlabored.     Breath sounds: Clear to auscultation posterior laterally.  Abdominal:      General: Bowel sounds are normal.      Palpations: Abdomen is soft.   Musculoskeletal:         General: Normal range of motion.      Cervical back: Normal range of motion.   Skin:     General: Skin is warm and dry.  Left parasternal loop recorder pocket is well-healed without redness swelling or drainage.  Neurological:      General: No focal deficit present.      Mental Status: Alert and oriented to person, place, and time.      Motor: Motor function is intact.   Psychiatric:         Attention and Perception: Attention and perception normal.         Mood and Affect: Mood and affect normal.         Speech: Speech normal.         Behavior: Behavior normal. Behavior is cooperative.          Thought Content: Thought content normal.         Cognition and Memory: Cognition and memory normal.     Labs and testing: Twelve-lead EKG reveals sinus rhythm without ectopics and no acute ischemic changes.  QRS durations 94 ms,  ms, QTc 428 ms.  Loop recorder interrogation dated September 20, 2024 revealed the loop recorder reached end of service on September 8, 2024.  No arrhythmic events were noted per review of loop recorder interrogations dated September 20, 2024, August 30, 2024, and August 23, 2024.  Sinus rhythm and sinus tach with PVCs was noted on the loop recorder interrogation dated August 16, 2024.      ASSESSMENT AND PLAN    Clinical impressions:  1.  Palpitations on no rate or rhythm controlling medications status post loop recorder implant ([facility] [place] jot Dx) on August 11, 2022 at end of service on September 8, 2024.  2.  Normal left ventricular function, ejection fraction 60% per 2D echocardiogram dated May 25, 2022.  3.  Valvular heart disease consisting of mild aortic valve cusp calcification, mild MR and TR.  4.  Graded exercise stress test dated May 25, 2022 revealed frequent PVCs in bigeminal form.  5.  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  6.  Dyslipidemia on statin.  7.  Gastroesophageal reflux disease.  8.  Peripheral neuropathy.  9.  Overweight, BMI 27.25.    Recommendations:  1.  Continue lifestyle modifications as discussed.  2.  I discussed removal of the loop recorder.  Patient states she does not want the loop recorder replaced.  She is not interested in undergoing a procedure for removal of the device.  She is aware that the battery is at end of service.  3.  Cancel the device check and office visit with Dr. Wiggins scheduled for December 10, 2024.  Patient will follow-up with Dr. Wiggins in 6 months or sooner if needed.  4.  Follow-up in office with Dr. Brown on February 5, 2024 at 11 AM as  scheduled or sooner if needed.  5.  Continue all current medications as prescribed.    Evaluation and note by Audrey Marcum, CNP  **Please excuse any errors in grammar or translation related to this dictation.  Voice recognition software was utilized to prepare this document.**

## 2024-11-17 LAB — NONINV COLON CA DNA+OCC BLD SCRN STL QL: NEGATIVE

## 2024-12-02 DIAGNOSIS — E78.2 MIXED HYPERLIPIDEMIA: ICD-10-CM

## 2024-12-02 PROCEDURE — RXMED WILLOW AMBULATORY MEDICATION CHARGE

## 2024-12-02 RX ORDER — ROSUVASTATIN CALCIUM 20 MG/1
20 TABLET, COATED ORAL DAILY
Qty: 90 TABLET | Refills: 3 | Status: SHIPPED | OUTPATIENT
Start: 2024-12-02 | End: 2025-12-02

## 2024-12-02 NOTE — TELEPHONE ENCOUNTER
Received request for prescription refills for patient.   Patient follows with Dr. Brown    Request is for Crestor  Is patient currently on medication yes    Last OV 2/5/24  Next OV 2/5/25    Pended for signing and sent to provider

## 2024-12-04 ENCOUNTER — OFFICE VISIT (OUTPATIENT)
Dept: ORTHOPEDIC SURGERY | Facility: CLINIC | Age: 64
End: 2024-12-04
Payer: COMMERCIAL

## 2024-12-04 ENCOUNTER — ANCILLARY PROCEDURE (OUTPATIENT)
Dept: ORTHOPEDIC SURGERY | Facility: CLINIC | Age: 64
End: 2024-12-04
Payer: COMMERCIAL

## 2024-12-04 DIAGNOSIS — M25.512 ACUTE PAIN OF LEFT SHOULDER: ICD-10-CM

## 2024-12-04 DIAGNOSIS — M75.82 ROTATOR CUFF TENDINITIS, LEFT: Primary | ICD-10-CM

## 2024-12-04 RX ORDER — LIDOCAINE HYDROCHLORIDE 10 MG/ML
5 INJECTION, SOLUTION INFILTRATION; PERINEURAL
Status: COMPLETED | OUTPATIENT
Start: 2024-12-04 | End: 2024-12-04

## 2024-12-04 RX ORDER — BETAMETHASONE SODIUM PHOSPHATE AND BETAMETHASONE ACETATE 3; 3 MG/ML; MG/ML
2 INJECTION, SUSPENSION INTRA-ARTICULAR; INTRALESIONAL; INTRAMUSCULAR; SOFT TISSUE
Status: COMPLETED | OUTPATIENT
Start: 2024-12-04 | End: 2024-12-04

## 2024-12-04 NOTE — PROGRESS NOTES
History of present illness: History left shoulder pain aggravated about 2 months ago while she was pulling she felt pain in the shoulder    Despite rest activity modification therapy program exercise bands she knows what to do if she went through rehab on the right shoulder a year or so ago that resolved nicely with cortisone shot and some exercise program she continued to have pain    Physical exam:    General: No acute distress or breathing difficulty or discomfort, pleasant and cooperative with the examination.    Extremities: The left shoulder was inspected and was found to have no obvious deformity.  There was tenderness to touch over the lateral edges of the shoulder over the rotator cuff insertion.  Active forward flexion 120 degrees, external rotation to 60 degrees, abduction to 45 degrees, and internal rotation to the level of L2.    At this time the shoulder is neurovascular tact and neurosensory intact.  Motor intact C5-T1.  There was no obvious neck pain or radiculopathy noted.  There was no gross instability or adhesive capsulitis symptoms.  There was no evidence of apprehension or apprehension suppression.    Strength was tested in 4 planes with weakness in the supraspinatus strength testing and external rotation position.  There was no strength deficit in internal rotation.  Impingement signs were positive both supine and standing for impingement test type I and II.  There was mild pain over the bicipital groove with a positive speeds sign    Before aspiration injection the risks of a cortisone injection including infection, local skin irritation, skin atrophy, calcification, continued pain and discomfort, elevated blood sugar, burning, failure to relieve pain, possible late infection were discussed with the patient.    Postprocedure discomfort can be alleviated with additional medications, ice, elevation, rest over the first 24 hours as recommended.    Patient verbalized understanding and wanted to  proceed with the planned procedure.    After informed consent was provided and allergies verified, the patient was positioned appropriately on thel bed.  The left shoulder to be aspirated and injected was prepped and draped in a sterile fashion.  The skin was anesthetized with ethyl chloride spray.  A joint aspiration was to be performed an 18-gauge needle was used otherwise a 22-gauge needle was used to inject the appropriate joint.    Joint injection was performed with a mixture of 5 cc 1% lidocaine plain and 2 cc Celestone Soluspan 6 mg per mL.  The needle was removed and the puncture site closed and sealed with a Band-Aid.  The patient tolerated the procedure well.    Diagnostic studies: X-rays are unremarkable 2 views left shoulder    Impression: Left shoulder cuff tendinitis remote chance of tear    Plan: Injection therapy ice rubber band rehab program stretching    See her back in 6 weeks if not significantly improved MRI will be in order if she does not get relief after 2 weeks from the shot and exercise program she can call and order the MRI sooner        L Inj/Asp: L subacromial bursa on 12/4/2024 3:28 PM  Indications: pain  Details: 22 G needle, medial approach  Medications: 2 mL betamethasone acet,sod phos 6 mg/mL; 5 mL lidocaine 10 mg/mL (1 %)  Outcome: tolerated well, no immediate complications  Procedure, treatment alternatives, risks and benefits explained, specific risks discussed. Consent was given by the patient. Immediately prior to procedure a time out was called to verify the correct patient, procedure, equipment, support staff and site/side marked as required.

## 2024-12-05 ENCOUNTER — APPOINTMENT (OUTPATIENT)
Dept: PRIMARY CARE | Facility: CLINIC | Age: 64
End: 2024-12-05
Payer: COMMERCIAL

## 2024-12-10 ENCOUNTER — APPOINTMENT (OUTPATIENT)
Dept: CARDIOLOGY | Facility: CLINIC | Age: 64
End: 2024-12-10
Payer: COMMERCIAL

## 2024-12-10 ENCOUNTER — APPOINTMENT (OUTPATIENT)
Dept: CARDIOLOGY | Facility: HOSPITAL | Age: 64
End: 2024-12-10
Payer: COMMERCIAL

## 2024-12-12 ENCOUNTER — TELEPHONE (OUTPATIENT)
Dept: ORTHOPEDIC SURGERY | Facility: CLINIC | Age: 64
End: 2024-12-12
Payer: COMMERCIAL

## 2024-12-12 ENCOUNTER — PHARMACY VISIT (OUTPATIENT)
Dept: PHARMACY | Facility: CLINIC | Age: 64
End: 2024-12-12
Payer: COMMERCIAL

## 2024-12-12 DIAGNOSIS — M75.82 ROTATOR CUFF TENDINITIS, LEFT: ICD-10-CM

## 2024-12-12 PROCEDURE — RXOTC WILLOW AMBULATORY OTC CHARGE

## 2024-12-12 NOTE — TELEPHONE ENCOUNTER
Patient said the injection for her LT shoulder didn't help and she wants to order the MRI. Can an order please be put in

## 2024-12-27 ENCOUNTER — HOSPITAL ENCOUNTER (OUTPATIENT)
Dept: RADIOLOGY | Facility: CLINIC | Age: 64
Discharge: HOME | End: 2024-12-27
Payer: COMMERCIAL

## 2024-12-27 DIAGNOSIS — M75.82 ROTATOR CUFF TENDINITIS, LEFT: ICD-10-CM

## 2024-12-27 PROCEDURE — 73221 MRI JOINT UPR EXTREM W/O DYE: CPT | Mod: LT

## 2025-01-02 ENCOUNTER — APPOINTMENT (OUTPATIENT)
Dept: PRIMARY CARE | Facility: CLINIC | Age: 65
End: 2025-01-02
Payer: COMMERCIAL

## 2025-01-15 ENCOUNTER — APPOINTMENT (OUTPATIENT)
Dept: ORTHOPEDIC SURGERY | Facility: CLINIC | Age: 65
End: 2025-01-15
Payer: COMMERCIAL

## 2025-01-15 DIAGNOSIS — M75.02 ADHESIVE CAPSULITIS OF LEFT SHOULDER: ICD-10-CM

## 2025-01-15 PROCEDURE — 99213 OFFICE O/P EST LOW 20 MIN: CPT | Performed by: ORTHOPAEDIC SURGERY

## 2025-01-15 NOTE — PROGRESS NOTES
History of present illness: Patient with restricted motion most noticeably painful internal rotation cortisone shot without much relief    Right shoulder was able to do well with therapy and rehab her left shoulder with persistent stiffness and pain    Physical exam:    General: No acute distress or breathing difficulty or discomfort, pleasant and cooperative with the examination.    Extremities: The left shoulder was inspected and was found to have no obvious deformity.  There was tenderness to touch over the lateral edges of the shoulder over the rotator cuff insertion.  Active forward flexion 160 degrees, external rotation to 80 degrees, abduction to 45 degrees, and internal rotation to the level of L2.    At this time the shoulder is neurovascular tact and neurosensory intact.  Motor intact C5-T1.  There was no obvious neck pain or radiculopathy noted.  There was no gross instability     It appears to be more of a restricted adhesive capsule lytic shoulder most noticeably with limited internal rotation    Strength was tested in 4 planes with weakness in the supraspinatus strength testing and external rotation position.  There was no strength deficit in internal rotation.  Impingement signs were positive both supine and standing for impingement test type I and II.  There was mild pain over the bicipital groove with a positive speeds sign    Diagnostic studies: MRI suggest thickening of the capsule consistent with adhesive capsulitis there is also some underlying labral tear of shoulder    Impression: Left shoulder restricted motion most noticeably limited internal rotation consistent with adhesive capsulitis    Plan: Formal therapy rehab stretching program    Will see her back in 5 to 6 weeks if she has not improved options would include a fluoroscopy intra-articular injection versus arthroscopic lysis of adhesion and manipulation under anesthesia

## 2025-01-21 PROCEDURE — RXMED WILLOW AMBULATORY MEDICATION CHARGE

## 2025-01-29 ENCOUNTER — APPOINTMENT (OUTPATIENT)
Dept: PHYSICAL THERAPY | Facility: CLINIC | Age: 65
End: 2025-01-29
Payer: COMMERCIAL

## 2025-01-29 DIAGNOSIS — M25.612 SHOULDER STIFFNESS, LEFT: ICD-10-CM

## 2025-01-29 DIAGNOSIS — M75.02 ADHESIVE CAPSULITIS OF LEFT SHOULDER: Primary | ICD-10-CM

## 2025-01-29 DIAGNOSIS — M25.512 LEFT SHOULDER PAIN, UNSPECIFIED CHRONICITY: ICD-10-CM

## 2025-01-29 PROCEDURE — 97162 PT EVAL MOD COMPLEX 30 MIN: CPT | Performed by: PHYSICAL THERAPIST

## 2025-01-29 PROCEDURE — 97110 THERAPEUTIC EXERCISES: CPT | Performed by: PHYSICAL THERAPIST

## 2025-01-29 ASSESSMENT — PATIENT HEALTH QUESTIONNAIRE - PHQ9
1. LITTLE INTEREST OR PLEASURE IN DOING THINGS: NOT AT ALL
SUM OF ALL RESPONSES TO PHQ9 QUESTIONS 1 AND 2: 0
2. FEELING DOWN, DEPRESSED OR HOPELESS: NOT AT ALL

## 2025-01-29 ASSESSMENT — PAIN SCALES - GENERAL: PAINLEVEL_OUTOF10: 0 - NO PAIN

## 2025-01-29 ASSESSMENT — PAIN DESCRIPTION - DESCRIPTORS: DESCRIPTORS: TIGHTNESS

## 2025-01-29 ASSESSMENT — ENCOUNTER SYMPTOMS
DEPRESSION: 1
OCCASIONAL FEELINGS OF UNSTEADINESS: 0
LOSS OF SENSATION IN FEET: 1

## 2025-01-31 ENCOUNTER — PHARMACY VISIT (OUTPATIENT)
Dept: PHARMACY | Facility: CLINIC | Age: 65
End: 2025-01-31
Payer: COMMERCIAL

## 2025-02-03 ENCOUNTER — APPOINTMENT (OUTPATIENT)
Dept: PRIMARY CARE | Facility: CLINIC | Age: 65
End: 2025-02-03
Payer: COMMERCIAL

## 2025-02-03 VITALS
SYSTOLIC BLOOD PRESSURE: 129 MMHG | HEIGHT: 67 IN | OXYGEN SATURATION: 97 % | HEART RATE: 73 BPM | DIASTOLIC BLOOD PRESSURE: 80 MMHG | WEIGHT: 175 LBS | BODY MASS INDEX: 27.47 KG/M2

## 2025-02-03 DIAGNOSIS — M79.645 CHRONIC PAIN OF LEFT THUMB: ICD-10-CM

## 2025-02-03 DIAGNOSIS — R03.0 BLOOD PRESSURE ELEVATED WITHOUT HISTORY OF HTN: Primary | ICD-10-CM

## 2025-02-03 DIAGNOSIS — R73.9 BORDERLINE HYPERGLYCEMIA: ICD-10-CM

## 2025-02-03 DIAGNOSIS — I47.10 PAROXYSMAL SUPRAVENTRICULAR TACHYCARDIA (CMS-HCC): ICD-10-CM

## 2025-02-03 DIAGNOSIS — E55.9 VITAMIN D DEFICIENCY: ICD-10-CM

## 2025-02-03 DIAGNOSIS — F33.0 MILD RECURRENT MAJOR DEPRESSION (CMS-HCC): ICD-10-CM

## 2025-02-03 DIAGNOSIS — E78.2 MIXED HYPERLIPIDEMIA: ICD-10-CM

## 2025-02-03 DIAGNOSIS — Z12.4 CERVICAL CANCER SCREENING: ICD-10-CM

## 2025-02-03 DIAGNOSIS — G89.29 CHRONIC PAIN OF LEFT THUMB: ICD-10-CM

## 2025-02-03 DIAGNOSIS — Z91.89 FRAMINGHAM CARDIAC RISK <10% IN NEXT 10 YEARS: Chronic | ICD-10-CM

## 2025-02-03 PROCEDURE — 1036F TOBACCO NON-USER: CPT | Performed by: INTERNAL MEDICINE

## 2025-02-03 PROCEDURE — 3008F BODY MASS INDEX DOCD: CPT | Performed by: INTERNAL MEDICINE

## 2025-02-03 PROCEDURE — 99213 OFFICE O/P EST LOW 20 MIN: CPT | Performed by: INTERNAL MEDICINE

## 2025-02-03 NOTE — PATIENT INSTRUCTIONS
Thank you very much for coming.  I am very happy to see you again!    I do understand that you have been having trouble with your LEFT THUMB, and that this has been going on for the past 3 years!  We need to do an x-ray of your thumb.  I will send word regarding results and possible changes.    There is the walk-in clinic downstairs.  You can have them evaluate your thumb.  They can treat this so that you have better control of pain and increased function.  You work with your hands.  You have to have them take a look at your hands.  Again, at the very least, x-ray of your thumb.  Please consider doing the walk-in clinic downstairs.    Please continue using your MELOXICAM daily as needed for pain.  Always with food.  Watch out for acid indigestion symptoms.    Please get your name off the list of consideration for KIDNEY DONOR.  You have a significant kidney history, and you can still be a donor, but let me evaluate you first.    Please come back in 6 months at the latest.  I will confer with you regarding results and possible changes.  Please continue to take care of yourself and your family, and please continue to pray for our recovery from this pandemic.    When you get a chance, get yourself vaccinated for TETANUS.  You can get this from your local friendly pharmacy, including the [facility].  It will be good for 10 years.    See you in 6 months.  Have my FASTING laboratory laminations done soon.  Have an x-ray of your left thumb done soon.  I will send word regarding results and possible changes.  Please consider visiting the walk-in clinic.    Please call me if you feel excessively tired, or if your mood is getting the better of you!  We need to keep you young and healthy and active!  Take care and God bless.            0  Return in 6 months.  40 minutes please.  Yearly PHYSICAL examination.  Consider repeat FASTING laboratory examinations.  Reassess mood, energy, function, preventive strategies,  cardiovascular risk.  Coordinate with orthopedics, physical therapy, neurology, cardiology, EP, urology, nephrology.  Reconsider donor status.            0

## 2025-02-03 NOTE — PROGRESS NOTES
"Subjective   Patient ID: Salina Gordon is a 64 y.o. female who presents for Follow-up (Patient presented today for a 4 month follow up./).    HPI     Review of Systems    Objective   /80 (BP Location: Left arm, Patient Position: Sitting, BP Cuff Size: Adult)   Pulse 73   Ht 1.702 m (5' 7\")   Wt 79.4 kg (175 lb)   SpO2 97%   BMI 27.41 kg/m²     Physical Exam    Assessment/Plan   Diagnoses and all orders for this visit:  Blood pressure elevated without history of HTN  -     Follow Up In Primary Care - Established; Future  -     CBC and Auto Differential; Future  -     Comprehensive Metabolic Panel; Future  -     TSH with reflex to Free T4 if abnormal; Future  -     Magnesium; Future  -     Urinalysis with Reflex Culture and Microscopic; Future  Paroxysmal supraventricular tachycardia (CMS-HCC)  -     Follow Up In Primary Care - Established; Future  -     CBC and Auto Differential; Future  -     Comprehensive Metabolic Panel; Future  -     TSH with reflex to Free T4 if abnormal; Future  -     Magnesium; Future  Borderline hyperglycemia  -     Follow Up In Primary Care - Established; Future  -     Comprehensive Metabolic Panel; Future  -     Urinalysis with Reflex Culture and Microscopic; Future  -     Hemoglobin A1C; Future  Mixed hyperlipidemia  -     Follow Up In Primary Care - Established; Future  -     Comprehensive Metabolic Panel; Future  -     Lipid Panel; Future  [place] cardiac risk <10% in next 10 years  Comments:  3.1% August 2024  Orders:  -     Follow Up In Primary Care - Established  -     Follow Up In Primary Care - [facility]; Future  -     Comprehensive Metabolic Panel; Future  -     Hemoglobin A1C; Future  -     Lipid Panel; Future  Vitamin D deficiency  -     Follow Up In Primary Care - Established; Future  -     Comprehensive Metabolic Panel; Future  -     Vitamin D 25-Hydroxy,Total (for eval of Vitamin D levels); Future  Chronic pain of left thumb  -     Follow Up In Primary Care - " Established; Future  -     XR thumb left MIN 2 views; Future  -     Comprehensive Metabolic Panel; Future  -     Vitamin B12; Future  -     Folate; Future  -     Creatine Kinase; Future  -     Uric Acid; Future  Cervical cancer screening  -     Follow Up In Primary Care - Established; Future  -     CBC and Auto Differential; Future  -     Urinalysis with Reflex Culture and Microscopic; Future  Mild recurrent major depression (CMS-HCC)  -     Follow Up In Primary Care - Established; Future  -     CBC and Auto Differential; Future  -     Comprehensive Metabolic Panel; Future  -     TSH with reflex to Free T4 if abnormal; Future  -     Vitamin D 25-Hydroxy,Total (for eval of Vitamin D levels); Future  -     Vitamin B12; Future  -     Folate; Future       Thank you very much for coming.  I am very happy to see you again!    I do understand that you have been having trouble with your LEFT THUMB, and that this has been going on for the past 3 years!  We need to do an x-ray of your thumb.  I will send word regarding results and possible changes.    There is the walk-in clinic downstairs.  You can have them evaluate your thumb.  They can treat this so that you have better control of pain and increased function.  You work with your hands.  You have to have them take a look at your hands.  Again, at the very least, x-ray of your thumb.  Please consider doing the walk-in clinic downstairs.    Please continue using your MELOXICAM daily as needed for pain.  Always with food.  Watch out for acid indigestion symptoms.    Please get your name off the list of consideration for KIDNEY DONOR.  You have a significant kidney history, and you can still be a donor, but let me evaluate you first.    Please come back in 6 months at the latest.  I will confer with you regarding results and possible changes.  Please continue to take care of yourself and your family, and please continue to pray for our recovery from this pandemic.    When you  get a chance, get yourself vaccinated for TETANUS.  You can get this from your local friendly pharmacy, including the [facility].  It will be good for 10 years.    See you in 6 months.  Have my FASTING laboratory laminations done soon.  Have an x-ray of your left thumb done soon.  I will send word regarding results and possible changes.  Please consider visiting the walk-in clinic.    Please call me if you feel excessively tired, or if your mood is getting the better of you!  We need to keep you young and healthy and active!  Take care and God bless.            0  Return in 6 months.  40 minutes please.  Yearly PHYSICAL examination.  Consider repeat FASTING laboratory examinations.  Reassess mood, energy, function, preventive strategies, cardiovascular risk.  Coordinate with orthopedics, physical therapy, neurology, cardiology, EP, urology, nephrology.  Reconsider donor status.            0

## 2025-02-05 ENCOUNTER — APPOINTMENT (OUTPATIENT)
Dept: CARDIOLOGY | Facility: CLINIC | Age: 65
End: 2025-02-05
Payer: COMMERCIAL

## 2025-02-05 LAB
25(OH)D3+25(OH)D2 SERPL-MCNC: 43 NG/ML (ref 30–100)
ALBUMIN SERPL-MCNC: 4.6 G/DL (ref 3.6–5.1)
ALP SERPL-CCNC: 84 U/L (ref 37–153)
ALT SERPL-CCNC: 31 U/L (ref 6–29)
ANION GAP SERPL CALCULATED.4IONS-SCNC: 10 MMOL/L (CALC) (ref 7–17)
AST SERPL-CCNC: 26 U/L (ref 10–35)
BASOPHILS # BLD AUTO: 28 CELLS/UL (ref 0–200)
BASOPHILS NFR BLD AUTO: 0.4 %
BILIRUB SERPL-MCNC: 0.6 MG/DL (ref 0.2–1.2)
BUN SERPL-MCNC: 21 MG/DL (ref 7–25)
CALCIUM SERPL-MCNC: 9.5 MG/DL (ref 8.6–10.4)
CHLORIDE SERPL-SCNC: 105 MMOL/L (ref 98–110)
CHOLEST SERPL-MCNC: 195 MG/DL
CHOLEST/HDLC SERPL: 3.3 (CALC)
CK SERPL-CCNC: 54 U/L (ref 20–243)
CO2 SERPL-SCNC: 24 MMOL/L (ref 20–32)
CREAT SERPL-MCNC: 0.71 MG/DL (ref 0.5–1.05)
EGFRCR SERPLBLD CKD-EPI 2021: 95 ML/MIN/1.73M2
EOSINOPHIL # BLD AUTO: 170 CELLS/UL (ref 15–500)
EOSINOPHIL NFR BLD AUTO: 2.4 %
ERYTHROCYTE [DISTWIDTH] IN BLOOD BY AUTOMATED COUNT: 12.8 % (ref 11–15)
EST. AVERAGE GLUCOSE BLD GHB EST-MCNC: 114 MG/DL
EST. AVERAGE GLUCOSE BLD GHB EST-SCNC: 6.3 MMOL/L
FOLATE SERPL-MCNC: 10.6 NG/ML
GLUCOSE SERPL-MCNC: 90 MG/DL (ref 65–99)
HBA1C MFR BLD: 5.6 % OF TOTAL HGB
HCT VFR BLD AUTO: 45 % (ref 35–45)
HDLC SERPL-MCNC: 59 MG/DL
HGB BLD-MCNC: 14.6 G/DL (ref 11.7–15.5)
LDLC SERPL CALC-MCNC: 108 MG/DL (CALC)
LYMPHOCYTES # BLD AUTO: 1882 CELLS/UL (ref 850–3900)
LYMPHOCYTES NFR BLD AUTO: 26.5 %
MAGNESIUM SERPL-MCNC: 1.9 MG/DL (ref 1.5–2.5)
MCH RBC QN AUTO: 29.2 PG (ref 27–33)
MCHC RBC AUTO-ENTMCNC: 32.4 G/DL (ref 32–36)
MCV RBC AUTO: 90 FL (ref 80–100)
MONOCYTES # BLD AUTO: 561 CELLS/UL (ref 200–950)
MONOCYTES NFR BLD AUTO: 7.9 %
NEUTROPHILS # BLD AUTO: 4459 CELLS/UL (ref 1500–7800)
NEUTROPHILS NFR BLD AUTO: 62.8 %
NONHDLC SERPL-MCNC: 136 MG/DL (CALC)
PLATELET # BLD AUTO: 335 THOUSAND/UL (ref 140–400)
PMV BLD REES-ECKER: 11.3 FL (ref 7.5–12.5)
POTASSIUM SERPL-SCNC: 4.8 MMOL/L (ref 3.5–5.3)
PROT SERPL-MCNC: 7 G/DL (ref 6.1–8.1)
RBC # BLD AUTO: 5 MILLION/UL (ref 3.8–5.1)
SODIUM SERPL-SCNC: 139 MMOL/L (ref 135–146)
TRIGL SERPL-MCNC: 162 MG/DL
TSH SERPL-ACNC: 2.11 MIU/L (ref 0.4–4.5)
URATE SERPL-MCNC: 4.9 MG/DL (ref 2.5–7)
VIT B12 SERPL-MCNC: 336 PG/ML (ref 200–1100)
WBC # BLD AUTO: 7.1 THOUSAND/UL (ref 3.8–10.8)

## 2025-02-06 LAB
APPEARANCE UR: ABNORMAL
BACTERIA #/AREA URNS HPF: ABNORMAL /HPF
BACTERIA UR CULT: ABNORMAL
BILIRUB UR QL STRIP: NEGATIVE
CAOX CRY #/AREA URNS HPF: ABNORMAL /HPF
COLOR UR: YELLOW
GLUCOSE UR QL STRIP: NEGATIVE
HGB UR QL STRIP: NEGATIVE
HYALINE CASTS #/AREA URNS LPF: ABNORMAL /LPF
KETONES UR QL STRIP: NEGATIVE
LEUKOCYTE ESTERASE UR QL STRIP: NEGATIVE
NITRITE UR QL STRIP: NEGATIVE
PH UR STRIP: 5.5 [PH] (ref 5–8)
PROT UR QL STRIP: NEGATIVE
RBC #/AREA URNS HPF: ABNORMAL /HPF
SERVICE CMNT-IMP: ABNORMAL
SP GR UR STRIP: 1.03 (ref 1–1.03)
SQUAMOUS #/AREA URNS HPF: ABNORMAL /HPF
URATE CRY #/AREA URNS HPF: ABNORMAL /HPF
WBC #/AREA URNS HPF: ABNORMAL /HPF

## 2025-02-07 ENCOUNTER — APPOINTMENT (OUTPATIENT)
Dept: PHYSICAL THERAPY | Facility: CLINIC | Age: 65
End: 2025-02-07
Payer: COMMERCIAL

## 2025-02-07 DIAGNOSIS — M25.512 LEFT SHOULDER PAIN, UNSPECIFIED CHRONICITY: Primary | ICD-10-CM

## 2025-02-07 DIAGNOSIS — M75.02 ADHESIVE CAPSULITIS OF LEFT SHOULDER: ICD-10-CM

## 2025-02-07 DIAGNOSIS — M25.612 SHOULDER STIFFNESS, LEFT: ICD-10-CM

## 2025-02-07 PROCEDURE — 97140 MANUAL THERAPY 1/> REGIONS: CPT | Performed by: PHYSICAL THERAPIST

## 2025-02-07 PROCEDURE — 97110 THERAPEUTIC EXERCISES: CPT | Performed by: PHYSICAL THERAPIST

## 2025-02-07 ASSESSMENT — PAIN SCALES - GENERAL: PAINLEVEL_OUTOF10: 0 - NO PAIN

## 2025-02-07 NOTE — PROGRESS NOTES
"Physical Therapy    Physical Therapy Treatment    Patient Name: Salina Gordon  MRN: [id number]  Today's Date: 2/7/2025    Current Problem  1. Left shoulder pain, unspecified chronicity        2. Adhesive capsulitis of left shoulder  Follow Up In Physical Therapy      3. Shoulder stiffness, left          General  Reason for Referral: (P) Left shoulder pain  Referred By: (P) Pro Pop MD    Insurance: [facility]  Allowed visits: 2/6 by 4/30/25    Subjective  Patient with no new complaints with regard to her left shoulder. She does have some increased thumb pain that she thinks is due to using wand to perform HEP; she presents with splint on left hand which does help somewhat.     Objective  Reviewed and progressed marked therapeutic exercise per patient tolerance (49906 - 35 minutes, 2 units) Pulleys 1'/'1'  Wand ER 10\"x5  Sidelying sleeper stretch 20\"x3  *Wand flexion OH 5\"x10  *SA punch 2x10  *Sidelying ER 2x10  *Theraband rows RTB 2x10  *Theraband extension RTB 2x10  Wand abduction 5\"x10    *added to HEP    MT (93007 - 10 minutes, 1 unit) PROM left shoulder ER/IR/flexion/abduction with end range hold per patient tolerance. Grade II-III anterior/posterior GH glides for mobility.     Assessment  Patient tolerated workout well with some improvement in ROM evident. Suspect more impingement source of symptoms as opposed to adhesive capsulitis. Encouraged continued compliance with HEP.     Plan  Continue per POC at this time.      "

## 2025-02-08 DIAGNOSIS — G89.29 CHRONIC PAIN OF LEFT THUMB: Primary | ICD-10-CM

## 2025-02-08 DIAGNOSIS — M79.645 CHRONIC PAIN OF LEFT THUMB: Primary | ICD-10-CM

## 2025-02-08 PROCEDURE — RXMED WILLOW AMBULATORY MEDICATION CHARGE

## 2025-02-08 RX ORDER — MELOXICAM 15 MG/1
TABLET ORAL
Qty: 30 TABLET | Refills: 1 | Status: SHIPPED | OUTPATIENT
Start: 2025-02-08

## 2025-02-12 ENCOUNTER — APPOINTMENT (OUTPATIENT)
Dept: PHYSICAL THERAPY | Facility: CLINIC | Age: 65
End: 2025-02-12
Payer: COMMERCIAL

## 2025-02-17 ENCOUNTER — APPOINTMENT (OUTPATIENT)
Dept: PHYSICAL THERAPY | Facility: CLINIC | Age: 65
End: 2025-02-17
Payer: COMMERCIAL

## 2025-02-17 DIAGNOSIS — M25.612 SHOULDER STIFFNESS, LEFT: ICD-10-CM

## 2025-02-17 DIAGNOSIS — M75.02 ADHESIVE CAPSULITIS OF LEFT SHOULDER: ICD-10-CM

## 2025-02-17 DIAGNOSIS — M25.512 LEFT SHOULDER PAIN, UNSPECIFIED CHRONICITY: Primary | ICD-10-CM

## 2025-02-17 PROCEDURE — 97140 MANUAL THERAPY 1/> REGIONS: CPT | Performed by: PHYSICAL THERAPIST

## 2025-02-17 PROCEDURE — 97110 THERAPEUTIC EXERCISES: CPT | Performed by: PHYSICAL THERAPIST

## 2025-02-17 ASSESSMENT — PAIN SCALES - GENERAL: PAINLEVEL_OUTOF10: 0 - NO PAIN

## 2025-02-17 NOTE — PROGRESS NOTES
"Physical Therapy    Physical Therapy Treatment    Patient Name: Salina Gordon  MRN: [id number]  Today's Date: 2/17/2025    Current Problem  1. Left shoulder pain, unspecified chronicity        2. Adhesive capsulitis of left shoulder  Follow Up In Physical Therapy      3. Shoulder stiffness, left          General  Reason for Referral: Left shoulder pain  Referred By: Pro Pop MD    Insurance: [facility]  Allowed visits: 3/6 by 4/30/25    Subjective  Patient with continued pain in her left shoulder stating \"it feels like I'm stuck\". She continues to have some thumb pain and is planning to have imaging of this soon. She does get some increased shoulder pain with work tasks. She denies pain currently but pain can go up to 6/10. She is not taking Meloxicam but is taking Motrin and this helps her thumb pain. She has been compliant with her HEP once per day.     Objective  Reviewed and progressed marked therapeutic exercise per patient tolerance (40434 - 34 minutes, 2 units) UBE 2'/2'  Wand ER 10\"x5  Sidelying sleeper stretch 20\"x3  Wand flexion OH 5\"x10  SA punch 1 lb 2x10  *Sidelying abduction OH 2x10  Sidelying ER 1 lb 2x10  Theraband rows RTB 2x10  Theraband extension RTB 2x10  Wand abduction 5\"x10  *Strap IR stretch 20\"x3     *added to HEP     MT (48001 - 11 minutes, 1 unit) PROM left shoulder ER/IR/flexion/abduction with end range hold per patient tolerance. Grade II-III anterior/posterior GH glides for mobility.     Assessment  Patient with cues required for proper form with exercises especially wand abduction as she tends to bring arm forward to compensate for limitation. ROM does appear to be improving. Encouraged continued compliance with HEP.     Plan  Continue per POC at this time.    "

## 2025-02-18 ENCOUNTER — HOSPITAL ENCOUNTER (OUTPATIENT)
Dept: RADIOLOGY | Facility: HOSPITAL | Age: 65
Discharge: HOME | End: 2025-02-18
Payer: COMMERCIAL

## 2025-02-18 ENCOUNTER — PHARMACY VISIT (OUTPATIENT)
Dept: PHARMACY | Facility: CLINIC | Age: 65
End: 2025-02-18
Payer: COMMERCIAL

## 2025-02-18 DIAGNOSIS — M79.645 CHRONIC PAIN OF LEFT THUMB: ICD-10-CM

## 2025-02-18 DIAGNOSIS — G89.29 CHRONIC PAIN OF LEFT THUMB: ICD-10-CM

## 2025-02-18 PROCEDURE — 73140 X-RAY EXAM OF FINGER(S): CPT | Mod: LT

## 2025-02-25 ENCOUNTER — APPOINTMENT (OUTPATIENT)
Dept: PHYSICAL THERAPY | Facility: CLINIC | Age: 65
End: 2025-02-25
Payer: COMMERCIAL

## 2025-02-26 ENCOUNTER — APPOINTMENT (OUTPATIENT)
Dept: ORTHOPEDIC SURGERY | Facility: CLINIC | Age: 65
End: 2025-02-26
Payer: COMMERCIAL

## 2025-02-26 DIAGNOSIS — M75.02 ADHESIVE CAPSULITIS OF LEFT SHOULDER: ICD-10-CM

## 2025-02-26 PROCEDURE — 99213 OFFICE O/P EST LOW 20 MIN: CPT | Performed by: ORTHOPAEDIC SURGERY

## 2025-02-26 NOTE — PROGRESS NOTES
History of present illness: Left shoulder classic adhesive capsulitis going on 4 to 5 months    Despite rest activity moderate patient subacromial injection limited response    10 years ago she had a surgery in the right developed adhesions that responded to conservative treatment but the left shoulder seems to be a little bit tougher she had MRI around [place] confirming adhesions    Physical exam:    General: No acute distress or breathing difficulty or discomfort, pleasant and cooperative with the examination.    Extremities: Classic adhesive capsulitis left shoulder limited external rotation    Overall impingement weakness with pain with possible pain with lifting neurovascular intact limited flexion to be around 80 degrees last night she is laying supine forced external rotation to 10 degrees is max she can internally regular posteriorly at crest she can abduct to [address] [facility] good pulses good injection sites clean and dry no note radiculopathy and no other significant injuries the right shoulder moves freely and well    Diagnostic studies: No new studies    Impression: Resistant adhesive capsulitis left shoulder    Plan: Fluoroscopy intra-articular injection can sometimes be beneficial both his imprisonment and extension of the Shoulder Doing Aggressive Therapy Program after the Left Shoulder Fluoroscopy Guided Intra-Articular Injection    I Will See Her Back 5 to 6 Weeks    If Not Significantly Improved Arthroscopic Lysis of Adhesions Will Be Recommended with Manipulation under Anesthesia

## 2025-03-03 ENCOUNTER — APPOINTMENT (OUTPATIENT)
Dept: CARDIOLOGY | Facility: CLINIC | Age: 65
End: 2025-03-03
Payer: COMMERCIAL

## 2025-03-03 VITALS
HEART RATE: 63 BPM | SYSTOLIC BLOOD PRESSURE: 136 MMHG | WEIGHT: 176 LBS | HEIGHT: 67 IN | BODY MASS INDEX: 27.62 KG/M2 | DIASTOLIC BLOOD PRESSURE: 70 MMHG

## 2025-03-03 DIAGNOSIS — I49.3 PREMATURE VENTRICULAR CONTRACTIONS: ICD-10-CM

## 2025-03-03 DIAGNOSIS — Z78.9 NEVER SMOKED CIGARETTES: ICD-10-CM

## 2025-03-03 DIAGNOSIS — R00.1 SINUS BRADYCARDIA: ICD-10-CM

## 2025-03-03 DIAGNOSIS — I47.19: ICD-10-CM

## 2025-03-03 DIAGNOSIS — E78.2 MIXED HYPERLIPIDEMIA: Primary | ICD-10-CM

## 2025-03-03 DIAGNOSIS — I47.10 PAROXYSMAL SUPRAVENTRICULAR TACHYCARDIA (CMS-HCC): ICD-10-CM

## 2025-03-03 PROCEDURE — 3008F BODY MASS INDEX DOCD: CPT | Performed by: INTERNAL MEDICINE

## 2025-03-03 PROCEDURE — 1036F TOBACCO NON-USER: CPT | Performed by: INTERNAL MEDICINE

## 2025-03-03 PROCEDURE — 99214 OFFICE O/P EST MOD 30 MIN: CPT | Performed by: INTERNAL MEDICINE

## 2025-03-03 PROCEDURE — RXMED WILLOW AMBULATORY MEDICATION CHARGE

## 2025-03-03 RX ORDER — LANOLIN ALCOHOL/MO/W.PET/CERES
400 CREAM (GRAM) TOPICAL DAILY
Qty: 90 TABLET | Refills: 3 | Status: SHIPPED | OUTPATIENT
Start: 2025-03-03 | End: 2026-03-03

## 2025-03-03 NOTE — PATIENT INSTRUCTIONS
Continue same medications and treatments.   Patient educated on proper medication use.   Patient educated on risk factor modification.   Please bring any lab results from other providers / physicians to your next appointment.     Please bring all medicines, vitamins, and herbal supplements with you when you come to the office.     Prescriptions will not be filled unless you are compliant with your follow up appointments or have a follow up appointment scheduled as per instruction of your physician. Refills should be requested at the time of your visit.    OBTAIN FASTING LAB WORK IN 1 YEAR PRIOR TO YOUR OFFICE VISIT    FOLLOW UP IN 1 YEAR    IMichelle LPN, am scribing for and in the presence of Dr. Marino Brown, DO, FACC

## 2025-03-03 NOTE — PROGRESS NOTES
CARDIOLOGY OFFICE VISIT      CHIEF COMPLAINT  Chief Complaint   Patient presents with    Follow-up     Patient following up today for general cardiac surveillance, last visit was complaining of syncopal episodes. Patient reports she is doing we;ll.        HISTORY OF PRESENT ILLNESS  Patient is a 64-year-old  female with past medical history significant for AV gypsy reentrant tachycardia, radiofrequency ablation , sinus bradycardia, hypercholesterolemia, nephrolithiasis who presents for follow-up cardiovascular care.    Office visit 2024  Patient has had some palpitations usually associated with stress and will have associated dyspnea.  Denies chest pain, dyspnea exertion, nausea, vomiting, dizziness, near-syncope, bc syncope, edema.  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  She currently drinks 4 cups of coffee per week.  She had a recent right rotator cuff tear that has been conservatively managed by Dr. Pop.  Patient has not been taking her cholesterol medication.    Office visit March 3, 2025  Patient continues to have left shoulder pain and is getting physical therapy.  She is scheduled to get an injection by Dr. Pop.  Denies midsternal chest pain, dyspnea, palpitations, nausea, vomiting, dizziness, near-syncope, bc syncope, edema.  No recent formal exercise program.        Past surgical history:  D&C  Right shoulder surgery  Lithotripsy     Social history:  Non-smoker  Rare alcohol use     Family history:  Father  82 COPD  Mother history of hypertension, hypercholesterolemia     Review of systems have been reviewed and are negative, noncontributory, or as previous mentioned x12 systems.     I personally reviewed EKG March 15, 2022: Normal sinus rhythm, anterior T wave inversion. No significant change compared to previous EKG December 10, 2015     Assessment:     Frequent ventricular ectopy  AV gypsy reentrant  tachycardia s/p RFA 2001  Sinus bradycardia  History of syncope  Hyperlipidemia  Nephrolithiasis  Intolerance magnesium oxide 400 mg twice daily due to diarrhea     Recommendations:  Patient appears to be stable from a cardiac standpoint. No recurrent syncope. Patient's been started on magnesium oxide for ventricular ectopy and palpitations. No symptoms of angina. No evidence of heart failure. We reviewed normal cardiac MRI findings.  Lipid profile improved compared to previous labs after being compliant with cholesterol medication.  Reengage in exercise program 30 minutes/day.  Routine follow-up with EP.  Follow-up with myself in 1 year.  Check CMP, lipid profile at that time.     Please excuse any errors in grammar or translation related to this dictation. Voice recognition software was utilized to prepare this document.     Recent Cardiovascular Testing:  The following results have been reviewed and updated.     Labs 2/4/25  1.T C 195, , HDL 59,         Carotid US 5/25/22  Mild     CT calcium score 4/27/22  Score = 0     MRI Cardiac 6/30/22  1.EF  72%  2.Delayed enhancement imaging is normal     Echo: 5/25/22  1. EF 60%.  2. Mild mitral valve regurgitation  3. Mild tricuspid regurgitation        GXT 5/25/22  1. Normal.  2. 10.1 METS  3. Frequent PVC's, ventricular bigeminy      48 Holter: 5/25/22  1. NSR  bpm, avg 70 bpm  2. Frequent PVC's (6.4%) ventricular bigeminy, ventricular couplets and triplets  3. PAT, longest 11 beats, rate 124 bpm.     VITALS  Vitals:    03/03/25 1316   BP: 136/70   Pulse: 63     Wt Readings from Last 4 Encounters:   03/03/25 79.8 kg (176 lb)   02/03/25 79.4 kg (175 lb)   10/10/24 78.9 kg (174 lb)   08/13/24 78.3 kg (172 lb 9.9 oz)       PHYSICAL EXAM:  GENERAL:  Well developed, well nourished, in no acute distress.  CHEST:  Symmetric and nontender.  NEURO/PSYCH:  Alert and oriented times three with approppriate behavior and responses.  NECK:  Supple, no JVD, no  bruit.  LUNGS:  Clear to auscultation bilaterally, normal respiratory effort.  HEART:  Rate and rhythm regularly with no evident murmur, no gallop appreciated.    There are no rubs, clicks or heaves.  EXTREMITIES:  Warm with good color, no clubbing or cyanosis.  There is no edema noted.  PERIPHERAL VASCULAR:  Pulses present and equally palpable; 2+ throughout.    ASSESSMENT AND PLAN  Diagnoses and all orders for this visit:  Mixed hyperlipidemia  -     Lipid panel; Future  Paroxysmal supraventricular tachycardia (CMS-HCC)  -     Comprehensive metabolic panel; Future  Sinus bradycardia  AV gypsy tachycardia (CMS-HCC)  Premature ventricular contractions  -     magnesium oxide (Mag-Ox) 400 mg (241.3 mg magnesium) tablet; Take 1 tablet (400 mg) by mouth once daily.  Never smoked cigarettes  BMI 27.0-27.9,adult      Past Medical History  Past Medical History:   Diagnosis Date    Personal history of other endocrine, nutritional and metabolic disease     History of hypercholesterolemia    Supraventricular tachycardia (CMS-HCC)     Sustained SVT       Social History  Social History     Tobacco Use    Smoking status: Never    Smokeless tobacco: Never   Substance Use Topics    Alcohol use: Yes     Comment: rare    Drug use: Never       Family History     Family History   Problem Relation Name Age of Onset    Hypertension Mother      Hyperlipidemia Mother      Other (age related macular degeneration) Mother      Cataracts Mother      Cataracts Father      COPD Father      Glaucoma Father      Leukemia Brother      Diabetes Other grandmother     Lymphoma Other grandmother         Allergies:  No Known Allergies     Outpatient Medications:  Current Outpatient Medications   Medication Instructions    acyclovir (ZOVIRAX) 400 mg, oral, 3 times daily PRN    calcium carbonate-vitamin D3 1,000 mg-20 mcg (800 unit) tablet 1 tablet, Daily    cholecalciferol (Vitamin D-3) 25 MCG (1000 UT) tablet 1 tablet, Daily    fexofenadine (ALLEGRA)  "180 mg, oral, Daily with evening meal    fluoride, sodium, (Prevident 5000 Plus) 1.1 % dental cream Brush teeth 2 times a day.    magnesium oxide (MAG-OX) 400 mg, Daily    meloxicam (Mobic) 15 mg tablet Please take 1 tablet by mouth with food every day as needed for pain.  Thank you.  Lots of fluids please throughout the day.    omeprazole OTC (PriLOSEC OTC) 20 mg EC tablet 1 tablet, Daily    rosuvastatin (CRESTOR) 20 mg, oral, Daily    sertraline (ZOLOFT) 50 mg, oral, Daily    triamcinolone (Kenalog) 0.1 % ointment Apply thin layer topically to affected area(s) every 8 hours. Do not cover with bandage.        Recent Lab Results:    CMP:    Lab Results   Component Value Date     02/04/2025    K 4.8 02/04/2025     02/04/2025    CO2 24 02/04/2025    BUN 21 02/04/2025    CREATININE 0.71 02/04/2025    GLUCOSE 90 02/04/2025    CALCIUM 9.5 02/04/2025       Magnesium:    Lab Results   Component Value Date    MG 1.9 02/04/2025       Lipid Profile:    Lab Results   Component Value Date    TRIG 162 (H) 02/04/2025    HDL 59 02/04/2025    LDLCALC 108 (H) 02/04/2025       TSH:    Lab Results   Component Value Date    TSH 2.11 02/04/2025       BNP:   No results found for: \"BNP\"     PT/INR:    Lab Results   Component Value Date    PROTIME 12.1 08/08/2022    INR 1.0 08/08/2022       HgBA1c:    Lab Results   Component Value Date    HGBA1C 5.6 02/04/2025       BMP:  Lab Results   Component Value Date     02/04/2025     06/19/2024     12/28/2023     01/25/2023     08/08/2022     03/15/2022    K 4.8 02/04/2025    K 4.7 06/19/2024    K 4.5 12/28/2023    K 4.2 01/25/2023    K 4.0 08/08/2022    K 4.0 03/15/2022     02/04/2025     06/19/2024     12/28/2023     01/25/2023     08/08/2022     03/15/2022    CO2 24 02/04/2025    CO2 26 06/19/2024    CO2 26 12/28/2023    CO2 26 01/25/2023    CO2 26 08/08/2022    CO2 25 03/15/2022    BUN 21 02/04/2025    BUN 16 " "06/19/2024    BUN 22 12/28/2023    BUN 17 01/25/2023    BUN 25 (H) 08/08/2022    BUN 20 03/15/2022    CREATININE 0.71 02/04/2025    CREATININE 0.82 06/19/2024    CREATININE 0.77 12/28/2023    CREATININE 0.78 01/25/2023    CREATININE 0.83 08/08/2022    CREATININE 0.78 03/15/2022       CBC:  Lab Results   Component Value Date    WBC 7.1 02/04/2025    WBC 6.1 06/19/2024    WBC 5.2 12/28/2023    WBC 7.5 08/08/2022    WBC 6.0 03/15/2022    WBC 17.4 (H) 01/04/2022    RBC 5.00 02/04/2025    RBC 4.24 06/19/2024    RBC 4.54 12/28/2023    RBC 4.45 08/08/2022    RBC 4.27 03/15/2022    RBC 4.61 01/04/2022    HGB 14.6 02/04/2025    HGB 12.9 06/19/2024    HGB 13.6 12/28/2023    HGB 12.9 08/08/2022    HGB 12.8 03/15/2022    HGB 13.6 01/04/2022    HCT 45.0 02/04/2025    HCT 39.1 06/19/2024    HCT 40.2 12/28/2023    HCT 39.5 08/08/2022    HCT 38.6 03/15/2022    HCT 42.4 01/04/2022    MCV 90.0 02/04/2025    MCV 92 06/19/2024    MCV 89 12/28/2023    MCV 89 08/08/2022    MCV 90 03/15/2022    MCV 92 01/04/2022    MCH 29.2 02/04/2025    MCH 30.4 06/19/2024    MCH 30.0 12/28/2023    MCHC 32.4 02/04/2025    MCHC 33.0 06/19/2024    MCHC 33.8 12/28/2023    MCHC 32.7 08/08/2022    MCHC 33.2 03/15/2022    MCHC 32.1 01/04/2022    RDW 12.8 02/04/2025    RDW 13.5 06/19/2024    RDW 13.1 12/28/2023    RDW 13.4 08/08/2022    RDW 13.6 03/15/2022    RDW 13.6 01/04/2022     02/04/2025     06/19/2024     12/28/2023     08/08/2022     03/15/2022     01/04/2022    MPV 11.3 02/04/2025       Cardiac Enzymes:    No results found for: \"TROPHS\"    Hepatic Function Panel:    Lab Results   Component Value Date    ALKPHOS 84 02/04/2025    ALT 31 (H) 02/04/2025    AST 26 02/04/2025    PROT 7.0 02/04/2025    BILITOT 0.6 02/04/2025       Debra COTTO LPN am scribing for, and in the presence of Dr. Marino Brown DO.    IDr. Marino DO, personally performed the services described in the documentation as scribed by " Debra Sánchez, LPN in my presence, and confirm it is both accurate and complete.     Marino Brown, DO

## 2025-03-11 ENCOUNTER — HOSPITAL ENCOUNTER (OUTPATIENT)
Dept: RADIOLOGY | Facility: HOSPITAL | Age: 65
Discharge: HOME | End: 2025-03-11
Payer: COMMERCIAL

## 2025-03-11 DIAGNOSIS — M75.02 ADHESIVE CAPSULITIS OF LEFT SHOULDER: ICD-10-CM

## 2025-03-11 PROCEDURE — 77002 NEEDLE LOCALIZATION BY XRAY: CPT | Mod: LEFT SIDE | Performed by: RADIOLOGY

## 2025-03-11 PROCEDURE — 2550000001 HC RX 255 CONTRASTS: Performed by: ORTHOPAEDIC SURGERY

## 2025-03-11 PROCEDURE — 20610 DRAIN/INJ JOINT/BURSA W/O US: CPT | Mod: LT

## 2025-03-11 PROCEDURE — 20610 DRAIN/INJ JOINT/BURSA W/O US: CPT | Mod: LEFT SIDE | Performed by: RADIOLOGY

## 2025-03-11 PROCEDURE — 2500000004 HC RX 250 GENERAL PHARMACY W/ HCPCS (ALT 636 FOR OP/ED): Performed by: RADIOLOGY

## 2025-03-11 RX ORDER — LIDOCAINE HYDROCHLORIDE 20 MG/ML
20 INJECTION, SOLUTION INFILTRATION; PERINEURAL ONCE
Status: COMPLETED | OUTPATIENT
Start: 2025-03-11 | End: 2025-03-11

## 2025-03-11 RX ORDER — BUPIVACAINE HYDROCHLORIDE 5 MG/ML
3 INJECTION, SOLUTION EPIDURAL; INTRACAUDAL ONCE
Status: COMPLETED | OUTPATIENT
Start: 2025-03-11 | End: 2025-03-11

## 2025-03-11 RX ORDER — METHYLPREDNISOLONE ACETATE 40 MG/ML
40 INJECTION, SUSPENSION INTRA-ARTICULAR; INTRALESIONAL; INTRAMUSCULAR; SOFT TISSUE ONCE
Status: COMPLETED | OUTPATIENT
Start: 2025-03-11 | End: 2025-03-11

## 2025-03-11 RX ADMIN — LIDOCAINE HYDROCHLORIDE 5 ML: 20 INJECTION, SOLUTION INFILTRATION; PERINEURAL at 08:36

## 2025-03-11 RX ADMIN — IOHEXOL 5 ML: 300 INJECTION, SOLUTION INTRAVENOUS at 08:34

## 2025-03-11 RX ADMIN — METHYLPREDNISOLONE ACETATE 40 MG: 40 INJECTION, SUSPENSION INTRA-ARTICULAR; INTRALESIONAL; INTRAMUSCULAR; INTRASYNOVIAL; SOFT TISSUE at 08:35

## 2025-03-11 RX ADMIN — BUPIVACAINE HYDROCHLORIDE 3 ML: 5 INJECTION, SOLUTION EPIDURAL; INTRACAUDAL; PERINEURAL at 08:35

## 2025-03-12 ENCOUNTER — APPOINTMENT (OUTPATIENT)
Dept: PHYSICAL THERAPY | Facility: CLINIC | Age: 65
End: 2025-03-12
Payer: COMMERCIAL

## 2025-03-12 DIAGNOSIS — M25.512 LEFT SHOULDER PAIN, UNSPECIFIED CHRONICITY: ICD-10-CM

## 2025-03-12 DIAGNOSIS — M25.612 SHOULDER STIFFNESS, LEFT: ICD-10-CM

## 2025-03-12 DIAGNOSIS — M75.02 ADHESIVE CAPSULITIS OF LEFT SHOULDER: Primary | ICD-10-CM

## 2025-03-12 PROCEDURE — 97110 THERAPEUTIC EXERCISES: CPT | Performed by: PHYSICAL THERAPIST

## 2025-03-12 PROCEDURE — 97140 MANUAL THERAPY 1/> REGIONS: CPT | Performed by: PHYSICAL THERAPIST

## 2025-03-12 NOTE — PROGRESS NOTES
"Physical Therapy    Physical Therapy Re-Evaluation    Patient Name: Salina Gordon  MRN: [id number]  Today's Date: 3/12/2025    Current Problem  1. Adhesive capsulitis of left shoulder  Follow Up In Physical Therapy      2. Shoulder stiffness, left        3. Left shoulder pain, unspecified chronicity          General  Reason for Referral: (P) Left shoulder pain  Referred By: (P) Pro Pop MD    Insurance:  Employee [employer]   Allowed visits: 4/6 by 4/30/25     Subjective  Patient had an injection yesterday which caused increased pain. She does not feel like she is moving any better. She does not have pain when she does not stretch but notes \"my neck is killing me\" as she has been unable to stretch due to pain. Chief complaint is difficulty pulling up her pants and donning/doffing her pants. Biggest limitation is difficulty with stretching and states \"I'm probably limited with a lot\" especially with reaching behind her back. She has held off on HEP for the last week. She will F/U with Dr. Pop in mid-April. She does have some numbness in her 4th digit of left hand.     Objective  Worst pain in the last 24 hours, 7-8 increased from 5-6/10     Precautions: STEADI = 8     Observation: posture WFL     AROM  Left shoulder flexion: 118 P!, decreased from 133 degrees P!   Left shoulder abduction: 102 P!, decreased from 116 degrees P!   Left shoulder extension: 43 degrees   Left shoulder ER @ 75 degrees abd: 28, decreased from 41 degrees   Left shoulder IR @ 75 degrees abd: 45, decreased from 54 degrees   Left HBB: sacral base  Limitation in left cervical AROM (< 60 degrees)     MMT  Deltoid flexion left: 4+ P!, improved from 4   Deltoid abduction left: 5, improved from 4+   ER @ 0 degrees abduction left: 4+, improved from 4  IR @ 0 degrees abduction left: 4 P!, decreased from 4+     Special Tests: Neer's positive, though limitation in ROM  HK negative   Lateral [place] negative  Belly press negative  Bear hug positive " "  Lift off positive  Spurling's negative  Spurling's A negative  Unable to achieve positioning for ULTT A    Quick DASH: 39% at IE    Re-evaluation performed on this date with new objective measurements obtained as above. See updated goals below.    Reviewed and progressed marked therapeutic exercise per patient tolerance (88381 - 34 minutes, 2 units) Wand ER 20\"x3  Sidelying sleeper stretch 20\"x3  Wand flexion OH 5\"x10  SA punch 1 lb 2x10  Sidelying abduction OH 2x10  Theraband rows RTB 2x10  Theraband extension RTB 2x10  Wand abduction 5\"x10  Strap IR stretch 20\"x3     MT (93549 - 12 minutes, 1 unit) PROM left shoulder ER/IR/flexion/abduction with end range hold per patient tolerance. Grade II-III anterior/posterior GH glides for mobility.     Assessment  Patient with improvement in strength but somewhat worse AROM and worse pain levels, even since receiving injection. Discussed nature and typical course of adhesive capsulitis with patient. Advised use of heat prior to HEP and use of ice following as needed to control pain. She will benefit from continued skilled care to promote pain management and mobility progression. Good potential.     Plan  Recommending continued PT management 1-2x/week for 3-4 weeks, 6 visits.     Goals  Independent with HEP to expedite progress and promote goal achievement - partially met   Decrease DASH to < or equal to 20% disabled for increased functional ability - not assessed this date  Increase AROM left shoulder flexion and abduction to > or equal to 150 degrees, extension to > or equal to 50 degrees, ER @ 90 degrees abduction to > or equal to 70 degrees, and HBB to > or equal to L1 for ease with reaching up and out to the side - somewhat worse  Increase strength left shoulder deltoid and RC to 5/5 without pain for ease with lifting and stabilizing with left UE - partially met  Decrease pain at worst with use of left UE to < or equal to 2/10 for improved QOL and confidence in " functional ability - somewhat worse.

## 2025-03-14 ENCOUNTER — PHARMACY VISIT (OUTPATIENT)
Dept: PHARMACY | Facility: CLINIC | Age: 65
End: 2025-03-14
Payer: COMMERCIAL

## 2025-03-18 ENCOUNTER — APPOINTMENT (OUTPATIENT)
Dept: PHYSICAL THERAPY | Facility: CLINIC | Age: 65
End: 2025-03-18
Payer: COMMERCIAL

## 2025-03-18 DIAGNOSIS — M25.512 LEFT SHOULDER PAIN, UNSPECIFIED CHRONICITY: ICD-10-CM

## 2025-03-18 DIAGNOSIS — M25.612 SHOULDER STIFFNESS, LEFT: ICD-10-CM

## 2025-03-18 DIAGNOSIS — M75.02 ADHESIVE CAPSULITIS OF LEFT SHOULDER: Primary | ICD-10-CM

## 2025-03-18 PROCEDURE — 97140 MANUAL THERAPY 1/> REGIONS: CPT | Performed by: PHYSICAL THERAPIST

## 2025-03-18 PROCEDURE — 97110 THERAPEUTIC EXERCISES: CPT | Performed by: PHYSICAL THERAPIST

## 2025-03-18 ASSESSMENT — PAIN SCALES - GENERAL: PAINLEVEL_OUTOF10: 0 - NO PAIN

## 2025-03-18 NOTE — PROGRESS NOTES
"Physical Therapy    Physical Therapy Treatment    Patient Name: Salina Gordon  MRN: [id number]  Today's Date: 3/18/2025    Current Problem  1. Adhesive capsulitis of left shoulder  Follow Up In Physical Therapy      2. Shoulder stiffness, left        3. Left shoulder pain, unspecified chronicity          General  Reason for Referral: Left shoulder pain  Referred By: Pro Pop MD    Insurance:  Employee [employer]   Allowed visits: 5/6 by 4/30/25     Subjective  Patient with no pain this AM but notes \"it's not doing anything\". She does not feel as though her motion is improving. She does not think recent injection was of benefit. She reports good tolerance to last workout stating \"I wasn't sore\". She has been compliant with her HEP.     Objective  Reviewed and progressed marked therapeutic exercise per patient tolerance (45381 - 33 minutes, 2 units) Pulleys 2'/2'  Wand ER 15\"x3  Sidelying sleeper stretch 20\"x3  Wand flexion OH 5\"x10  SA punch 2 lb 2x10  Sidelying abduction OH 2x10  *Sidelying ER 2x10  Theraband rows GTB 2x10  Theraband extension GTB 2x10  Wand abduction 5\"x10  Strap IR stretch 20\"x3     MT (47116 - 12 minutes, 1 unit) PROM left shoulder ER/IR/flexion/abduction with end range hold per patient tolerance. Grade II-III anterior/posterior GH glides for mobility.     Assessment  Patient with most pain at end range ER and abduction. She does demonstrate some improvement in ROM as well as exercise capacity. Encouraged continued compliance with HEP.    Plan  Continue per POC at this time.    "

## 2025-03-21 PROCEDURE — RXMED WILLOW AMBULATORY MEDICATION CHARGE

## 2025-03-24 ENCOUNTER — APPOINTMENT (OUTPATIENT)
Dept: PHYSICAL THERAPY | Facility: CLINIC | Age: 65
End: 2025-03-24
Payer: COMMERCIAL

## 2025-03-24 DIAGNOSIS — M75.02 ADHESIVE CAPSULITIS OF LEFT SHOULDER: Primary | ICD-10-CM

## 2025-03-24 DIAGNOSIS — M25.612 SHOULDER STIFFNESS, LEFT: ICD-10-CM

## 2025-03-24 DIAGNOSIS — M25.512 LEFT SHOULDER PAIN, UNSPECIFIED CHRONICITY: ICD-10-CM

## 2025-03-24 PROCEDURE — 97110 THERAPEUTIC EXERCISES: CPT | Performed by: PHYSICAL THERAPIST

## 2025-03-24 PROCEDURE — 97140 MANUAL THERAPY 1/> REGIONS: CPT | Performed by: PHYSICAL THERAPIST

## 2025-03-24 ASSESSMENT — PAIN SCALES - GENERAL: PAINLEVEL_OUTOF10: 0 - NO PAIN

## 2025-03-24 NOTE — PROGRESS NOTES
"Physical Therapy    Physical Therapy Treatment    Patient Name: Salina Gordon  MRN: [id number]  Today's Date: 3/24/2025      Current Problem  1. Adhesive capsulitis of left shoulder  Follow Up In Physical Therapy      2. Shoulder stiffness, left        3. Left shoulder pain, unspecified chronicity          General  Reason for Referral: (P) Left shoulder pain  Referred By: (P) Pro Pop MD     Insurance:  Employee [employer]   Allowed visits: 6/6 by 4/30/25     Subjective  Patient with no significant change in symptoms. She continues to have difficulty with ROM as well as pain up to 7/10 at times. She is planning to call Dr. Pop today. She is not compliant with her HEP on her work days.     Objective  Reviewed and progressed marked therapeutic exercise per patient tolerance (32711 - 33 minutes, 2 units) Pulleys 2'/2'  Wand ER 20\"x3  Sidelying sleeper stretch 20\"x3  Wand flexion OH 5\"x10  SA punch 2 lb 2x12  Sidelying abduction OH 2x10  *Sidelying HA 2x10  Sidelying ER 2x12  Theraband rows GTB 2x12  Theraband extension GTB 2x10  Wand abduction 5\"x10  Strap IR stretch 20\"x3     MT (24422 - 12 minutes, 1 unit) PROM left shoulder ER/IR/flexion/abduction with end range hold per patient tolerance. Grade II-III anterior/posterior GH glides for mobility.     Assessment  Patient with some improvement in mobility. She demonstrates good carryover with exercises. Encouraged continued compliance with HEP.     Plan  Continue per POC at this time.    "

## 2025-03-26 ENCOUNTER — PHARMACY VISIT (OUTPATIENT)
Dept: PHARMACY | Facility: CLINIC | Age: 65
End: 2025-03-26
Payer: COMMERCIAL

## 2025-04-07 ENCOUNTER — APPOINTMENT (OUTPATIENT)
Dept: PHYSICAL THERAPY | Facility: CLINIC | Age: 65
End: 2025-04-07
Payer: COMMERCIAL

## 2025-04-07 DIAGNOSIS — M25.612 SHOULDER STIFFNESS, LEFT: ICD-10-CM

## 2025-04-07 DIAGNOSIS — M75.02 ADHESIVE CAPSULITIS OF LEFT SHOULDER: Primary | ICD-10-CM

## 2025-04-07 DIAGNOSIS — M25.512 LEFT SHOULDER PAIN, UNSPECIFIED CHRONICITY: ICD-10-CM

## 2025-04-07 PROCEDURE — 97140 MANUAL THERAPY 1/> REGIONS: CPT | Performed by: PHYSICAL THERAPIST

## 2025-04-07 PROCEDURE — 97110 THERAPEUTIC EXERCISES: CPT | Performed by: PHYSICAL THERAPIST

## 2025-04-07 ASSESSMENT — PAIN SCALES - GENERAL: PAINLEVEL_OUTOF10: 0 - NO PAIN

## 2025-04-07 NOTE — PROGRESS NOTES
"Physical Therapy    Physical Therapy Treatment    Patient Name: Salina Gordon  MRN: [id number]  Today's Date: 4/7/2025    Current Problem  1. Adhesive capsulitis of left shoulder  Follow Up In Physical Therapy      2. Shoulder stiffness, left        3. Left shoulder pain, unspecified chronicity          General  Reason for Referral: Left shoulder pain  Referred By: Pro Pop MD    Insurance:  Employee Medical   Allowed visits: 1/4 by 4/28/25     Subjective  Patient with some improvement in ROM as she as discovered that stretching on her side into abduction and stretching behind her back is quite helpful. She does have pain with stretching but thinks her ROM is improved. She denies pain at rest.     Objective  Reviewed and progressed marked therapeutic exercise per patient tolerance (52613 - 33 minutes, 2 units) UBE 2'/2'  Wand ER 20\"x3  Sidelying sleeper stretch0\"x3  Wand flexion OH 10\"x5  SA punch 3 lb 2x10  Sidelying abduction OH 5\"x10  *Sidelying HA 2x10  Sidelying ER 2x10  Theraband rows BuTB 2x12  Theraband extension BuTB 2x10  Wand abduction 5\"x10  Strap IR stretch 20\"x3     MT (61533 - 12 minutes, 1 unit) PROM left shoulder ER/IR/flexion/abduction with end range hold per patient tolerance. Grade II-III anterior/posterior GH glides for mobility.     Assessment  Patient with improvement in abduction and IR AROM evident. She demonstrates good carryover with HEP overall. Encouraged continued compliance with exercises but to be cautious with pushing stretches to point of pain.     Plan  Continue per POC at this time.    "

## 2025-04-14 ENCOUNTER — APPOINTMENT (OUTPATIENT)
Dept: PHYSICAL THERAPY | Facility: CLINIC | Age: 65
End: 2025-04-14
Payer: COMMERCIAL

## 2025-04-14 DIAGNOSIS — M75.02 ADHESIVE CAPSULITIS OF LEFT SHOULDER: Primary | ICD-10-CM

## 2025-04-14 DIAGNOSIS — M25.512 LEFT SHOULDER PAIN, UNSPECIFIED CHRONICITY: ICD-10-CM

## 2025-04-14 DIAGNOSIS — M25.612 SHOULDER STIFFNESS, LEFT: ICD-10-CM

## 2025-04-14 PROCEDURE — 97140 MANUAL THERAPY 1/> REGIONS: CPT | Performed by: PHYSICAL THERAPIST

## 2025-04-14 PROCEDURE — 97110 THERAPEUTIC EXERCISES: CPT | Performed by: PHYSICAL THERAPIST

## 2025-04-14 ASSESSMENT — PAIN SCALES - GENERAL: PAINLEVEL_OUTOF10: 0 - NO PAIN

## 2025-04-14 NOTE — PROGRESS NOTES
"Physical Therapy    Physical Therapy Treatment    Patient Name: Salina Gordon  MRN: [id number]  Today's Date: 4/14/2025    Current Problem  1. Adhesive capsulitis of left shoulder        2. Shoulder stiffness, left        3. Left shoulder pain, unspecified chronicity          General  Reason for Referral: (P) Left shoulder pain  Referred By: (P) Pro Pop MD    Insurance:  Employee Medical   Allowed visits: 2/4 by 4/28/25     Subjective  Patient with no pain in left shoulder \"unless I stretch it\". She has been doing well with work but has not been as busy. She is having an increased ease with lifting.     Objective  Reviewed and progressed marked therapeutic exercise per patient tolerance (33001 - 33 minutes, 2 units) UBE 2'/2'  Wand ER 30\"x3  Sidelying sleeper stretch 30\"x3  Wand flexion OH 5\"x10  SA punch 3 lb 2x12  Sidelying abduction OH 5\"x10  Sidelying HA 2x10  Sidelying ER 2x10  Theraband rows BuTB 2x12  Theraband extension BuTB 2x10  *Doorway stretch 20\"x3  Wand abduction 5\"x10  Strap IR stretch 20\"x3     MT (67502 - 12 minutes, 1 unit) PROM left shoulder ER/IR/flexion/abduction with end range hold per patient tolerance. Grade II-III anterior/posterior GH glides for mobility.     Assessment  Patient tolerated workout well with improvement ROM and less pain evident throughout. Encouraged continued compliance with HEP.     Plan  Continue per POC at this time.    "

## 2025-04-19 NOTE — PROGRESS NOTES
Patient ID: Salina Gordon is a 64 y.o. female who presents for No chief complaint on file..  History of Present Illness  The patient, with a history of PVCs and tachycardia, presents for a follow-up visit. She reports feeling well with no lightheadedness, dizziness, or syncope. She has been taking magnesium for occasional premature beats, which she believes has been effective. The patient's last loop recorder reading in September showed no evidence of arrhythmias.    The patient also mentions feeling better overall this year, possibly due to reduced stress and increased outdoor activity. 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 She expresses a desire to lose weight and increase her physical activity, aiming for the recommended 150 minutes of aerobic activity per week.    In addition to her cardiac issues, the patient is also dealing with a frozen shoulder and is currently undergoing physical therapy. She has no new allergies or issues with anesthesia to report.    PMHx:  See list    PSHx:  See list    Social Hx:  Social History[1]    Family Hx:  Family History[2]    Review of Systems   Constitutional: Negative for malaise/fatigue.   Cardiovascular: Negative.  Negative for claudication, cyanosis, irregular heartbeat, leg swelling, near-syncope, orthopnea, paroxysmal nocturnal dyspnea and syncope.   Respiratory:  Negative for cough, shortness of breath, snoring and wheezing.    All other systems reviewed and are negative.      Objective     BP 98/60 (BP Location: Right arm, Patient Position: Sitting)   Pulse 65   Wt 79.7 kg (175 lb 12.8 oz)   BMI 27.53 kg/m²        LABS:  CBC:   Lab Results   Component Value Date    WBC 7.1 02/04/2025    RBC 5.00 02/04/2025    HGB 14.6 02/04/2025    HCT 45.0 02/04/2025    MCV 90.0 02/04/2025    MCH 29.2 02/04/2025    MCHC 32.4 02/04/2025    RDW 12.8 02/04/2025     02/04/2025    MPV 11.3 02/04/2025     CBC with  Differential:    Lab Results   Component Value Date    WBC 7.1 02/04/2025    RBC 5.00 02/04/2025    HGB 14.6 02/04/2025    HCT 45.0 02/04/2025     02/04/2025    MCV 90.0 02/04/2025    MCH 29.2 02/04/2025    MCHC 32.4 02/04/2025    RDW 12.8 02/04/2025    NRBC 0.0 06/19/2024    LYMPHOPCT 26.5 02/04/2025    MONOPCT 7.9 02/04/2025    EOSPCT 2.4 02/04/2025    BASOPCT 0.4 02/04/2025    MONOSABS 0.57 06/19/2024    LYMPHSABS 2.23 06/19/2024    EOSABS 170 02/04/2025    BASOSABS 28 02/04/2025     CMP:    Lab Results   Component Value Date     02/04/2025    K 4.8 02/04/2025     02/04/2025    CO2 24 02/04/2025    BUN 21 02/04/2025    CREATININE 0.71 02/04/2025    GLUCOSE 90 02/04/2025    PROT 7.0 02/04/2025    CALCIUM 9.5 02/04/2025    BILITOT 0.6 02/04/2025    ALKPHOS 84 02/04/2025    AST 26 02/04/2025    ALT 31 (H) 02/04/2025     BMP:    Lab Results   Component Value Date     02/04/2025    K 4.8 02/04/2025     02/04/2025    CO2 24 02/04/2025    BUN 21 02/04/2025    CREATININE 0.71 02/04/2025    CALCIUM 9.5 02/04/2025    GLUCOSE 90 02/04/2025     Magnesium:  Lab Results   Component Value Date    MG 1.9 02/04/2025           Constitutional:       Appearance: Normal and healthy appearance. Well-developed, overweight and not in distress.   Neck:      Vascular: No JVR. JVD normal.   Pulmonary:      Effort: Pulmonary effort is normal.      Breath sounds: Normal breath sounds. No wheezing. No rhonchi. No rales.   Chest:      Chest wall: Not tender to palpatation.   Cardiovascular:      PMI at left midclavicular line. Normal rate. Regular rhythm. Normal S1. Normal S2.       Murmurs: There is no murmur.      No gallop.  No click. No rub.   Pulses:     Intact distal pulses.   Edema:     Peripheral edema absent.   Abdominal:      Tenderness: There is no abdominal tenderness.   Musculoskeletal: Normal range of motion.         General: No tenderness. Skin:     General: Skin is warm and dry.   Neurological:       General: No focal deficit present.      Mental Status: Alert and oriented to person, place and time.       ECG. See scanned.    Assessment & Plan  Premature ventricular contractions  Well-managed with rare PVCs. ECG shows regular rhythm, heart rate in the sixties. Magnesium effective. No additional diagnostics or treatment changes needed.  - Continue magnesium supplementation.  - Encourage 150 minutes of aerobic activity per week.  - Limit caffeine to one beverage per day.  - Avoid alcohol and stimulants.  - Monitor potassium levels periodically.    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  - Continue physical therapy.    Other medical issues hyperlipidemia, nephrolithiasis, osteopenia, vitamin D deficiency, and overweight noted    Counseled greater than 50% of the visit.  The patient and I discussed arrhythmia, ECG, shared decision making, heart association recommendations, behavioral modifications, treatment options, risk, benefits, and imponderables.  Lifestyle modifications reviewed.  All questions answered.  Patient appreciative of care  Assessment & Plan  Paroxysmal supraventricular tachycardia (CMS-HCC)    Sinus bradycardia    Syncope, unspecified syncope type    Premature ventricular contractions    AV gypsy tachycardia (CMS-HCC)    BMI 27.0-27.9,adult    Never smoked cigarettes    Encounter for medication review and counseling    Encounter to discuss test results      Crow Webb LPN   I, CROW WEBB LPN, AM SCRIBING FOR AND IN THE PRESENCE OF DR. REAGAN SANTOS MD, FACC, FACP, [facility]  I, , personally performed the services described in the documentation as scribed by the nurse in my presence, and confirm it is both accurate and complete.     This medical note was created with the assistance of artificial intelligence (AI) for documentation purposes. The content has been reviewed and  confirmed by the healthcare provider for accuracy and completeness. Patient consented to the use of audio recording and use of AI during their visit.     Total, 42 inclusive review of previous evaluation, PVCs, and follow-up       [1]   Social History  Socioeconomic History    Marital status:    Tobacco Use    Smoking status: Never    Smokeless tobacco: Never   Substance and Sexual Activity    Alcohol use: Yes     Comment: rare    Drug use: Never    Sexual activity: Defer   [2]   Family History  Problem Relation Name Age of Onset    Hypertension Mother      Hyperlipidemia Mother      Other (age related macular degeneration) Mother      Cataracts Mother      Cataracts Father      COPD Father      Glaucoma Father      Leukemia Brother      Diabetes Other grandmother     Lymphoma Other grandmother

## 2025-04-22 ENCOUNTER — APPOINTMENT (OUTPATIENT)
Dept: CARDIOLOGY | Facility: CLINIC | Age: 65
End: 2025-04-22
Payer: COMMERCIAL

## 2025-04-22 ENCOUNTER — TREATMENT (OUTPATIENT)
Dept: PHYSICAL THERAPY | Facility: CLINIC | Age: 65
End: 2025-04-22
Payer: COMMERCIAL

## 2025-04-22 VITALS
DIASTOLIC BLOOD PRESSURE: 60 MMHG | SYSTOLIC BLOOD PRESSURE: 98 MMHG | HEART RATE: 65 BPM | BODY MASS INDEX: 27.53 KG/M2 | WEIGHT: 175.8 LBS

## 2025-04-22 DIAGNOSIS — I47.19: ICD-10-CM

## 2025-04-22 DIAGNOSIS — R55 SYNCOPE, UNSPECIFIED SYNCOPE TYPE: ICD-10-CM

## 2025-04-22 DIAGNOSIS — Z78.9 NEVER SMOKED CIGARETTES: ICD-10-CM

## 2025-04-22 DIAGNOSIS — I49.3 PREMATURE VENTRICULAR CONTRACTIONS: ICD-10-CM

## 2025-04-22 DIAGNOSIS — M75.02 ADHESIVE CAPSULITIS OF LEFT SHOULDER: Primary | ICD-10-CM

## 2025-04-22 DIAGNOSIS — I47.10 PAROXYSMAL SUPRAVENTRICULAR TACHYCARDIA (CMS-HCC): Primary | ICD-10-CM

## 2025-04-22 DIAGNOSIS — Z71.89 ENCOUNTER FOR MEDICATION REVIEW AND COUNSELING: ICD-10-CM

## 2025-04-22 DIAGNOSIS — Z71.2 ENCOUNTER TO DISCUSS TEST RESULTS: ICD-10-CM

## 2025-04-22 DIAGNOSIS — R00.1 SINUS BRADYCARDIA: ICD-10-CM

## 2025-04-22 DIAGNOSIS — M25.512 LEFT SHOULDER PAIN, UNSPECIFIED CHRONICITY: ICD-10-CM

## 2025-04-22 DIAGNOSIS — M25.612 SHOULDER STIFFNESS, LEFT: ICD-10-CM

## 2025-04-22 PROCEDURE — 99215 OFFICE O/P EST HI 40 MIN: CPT | Performed by: INTERNAL MEDICINE

## 2025-04-22 PROCEDURE — 1036F TOBACCO NON-USER: CPT | Performed by: INTERNAL MEDICINE

## 2025-04-22 PROCEDURE — 93000 ELECTROCARDIOGRAM COMPLETE: CPT | Performed by: INTERNAL MEDICINE

## 2025-04-22 PROCEDURE — 97110 THERAPEUTIC EXERCISES: CPT | Performed by: PHYSICAL THERAPIST

## 2025-04-22 PROCEDURE — 97140 MANUAL THERAPY 1/> REGIONS: CPT | Performed by: PHYSICAL THERAPIST

## 2025-04-22 ASSESSMENT — ENCOUNTER SYMPTOMS
CARDIOVASCULAR NEGATIVE: 1
IRREGULAR HEARTBEAT: 0
WHEEZING: 0
NEAR-SYNCOPE: 0
PND: 0
ORTHOPNEA: 0
SHORTNESS OF BREATH: 0
CLAUDICATION: 0
COUGH: 0
SYNCOPE: 0
SNORING: 0

## 2025-04-22 ASSESSMENT — PAIN SCALES - GENERAL: PAINLEVEL_OUTOF10: 0 - NO PAIN

## 2025-04-22 NOTE — PATIENT INSTRUCTIONS
Follow up with Dr. Wiggins in 1 year  Continue same medications and treatments.   Patient educated on proper medication use.   Patient educated on risk factor modification.   Please bring any lab results from other providers / physicians to your next appointment.     Please bring all medicines, vitamins, and herbal supplements with you when you come to the office.     Prescriptions will not be filled unless you are compliant with your follow up appointments or have a follow up appointment scheduled as per instruction of your physician. Refills should be requested at the time of your visit.

## 2025-04-22 NOTE — PROGRESS NOTES
"Physical Therapy    Physical Therapy Treatment    Patient Name: Salina Gordon  MRN: [id number]  Today's Date: 4/22/2025    Current Problem  1. Adhesive capsulitis of left shoulder        2. Shoulder stiffness, left        3. Left shoulder pain, unspecified chronicity          General  Reason for Referral: (P) Left shoulder pain  Referred By: (P) Pro Pop MD    Insurance:  Employee [employer]   Allowed visits: 3/4 by 4/28/25    Subjective  Patient with no new complaints today. She does feels as though Meloxicam has helped her thumb pain which is making HEP easier to perform. She denies shoulder pain currently.     Objective  Reviewed and progressed marked therapeutic exercise per patient tolerance (82046 - 33 minutes, 2 units) UBE 2'/2'  Wand ER 30\"x3  Sidelying sleeper stretch 30\"x3  Wand flexion OH 5\"x10  SA punch 4 lbs 2x10  Sidelying abduction OH 2 lbs 2x10  Sidelying ER 2 lbs 2x10  Theraband rows BuTB 2x12  Theraband extension BuTB 2x10  Strap IR stretch 20\"x3  *Wall abduction stretch 10\"x5    MT (71205 - 12 minutes, 1 unit) PROM left shoulder ER/IR/flexion/abduction with end range hold per patient tolerance. Grade II-III anterior/posterior GH glides for mobility.     Assessment  Patient tolerated workout well with most discomfort and limitation occurring with ER and abduction ROM. She is progressing however. Encouraged continued compliance with HEP.     Plan  Continue per POC at this time.    "

## 2025-04-25 NOTE — PROGRESS NOTES
"Physical Therapy    Physical Therapy Re-Evaluation    Patient Name: Salina Gordon  MRN: [id number]  Today's Date: 4/28/2025    Current Problem  1. Adhesive capsulitis of left shoulder        2. Shoulder stiffness, left        3. Left shoulder pain, unspecified chronicity          General  Reason for Referral: (P) Left shoulder pain  Referred By: (P) Pro Pop MD    Insurance:  Employee Medical   Allowed visits: 4/4 by 4/28/25    Subjective  Patient with improvement in ability to reach and move her left UE. She continues to have some pain and notes \"it's not as long and I'm not as guarded with it\". Chief complaint is \"I wish it would heal faster\". Biggest limitation is \"nothing really\". She has much increased ease with reaching behind her back. She does not F/U with Dr. Pop again. She is having some neck symptoms as of late.     Objective  Worst pain in the last 24 hours, 0 decreased from 7-8/10     Precautions: STEADI = 8     Observation: posture WFL     AROM  Left shoulder flexion: 136, decreased from 118 degrees P!   Left shoulder abduction: 107 P!, decreased from 102 degrees P!   Left shoulder extension: 43 degrees   Left shoulder ER @ 90 degrees abd: 46, improved from 28 degrees @ 75 degrees abd  Left shoulder IR @ 90 degrees abd: 55, improved from 45 degrees @ 75 degrees abd  Left HBB: T10, improved from sacral base  Limitation in left cervical AROM (< 60 degrees)      MMT  Deltoid flexion left: 5, improved from 4+ P!  Deltoid abduction left: 5,  ER @ 0 degrees abduction left: 4+, improved from 4  IR @ 0 degrees abduction left: 5, improved from 4 P!    Quick DASH: 11%, improved from 39%     Re-evaluation performed on this date with new objective measurements obtained as above. See updated goals below.    Reviewed and progressed marked therapeutic exercise per patient tolerance (53233 - 33 minutes, 2 units) Wand ER 30\"x3  Sidelying sleeper stretch 30\"x3  Wand flexion OH 5\"x10  SA punch 5 lbs " "2x10  Sidelying abduction OH 2 lbs 2x10  Sidelying ER 2 lbs 2x10  Strap IR stretch 20\"x3  Wall abduction stretch 10\"x5     MT (06381 - 12 minutes, 1 unit) PROM left shoulder ER/IR/flexion/abduction with end range hold per patient tolerance. Grade II-III anterior/posterior GH glides for mobility.     Assessment  Patient with improvement in AROM, strength, pain level, and overall function. She will benefit from continued compliance with HEP to maintain therapeutic gains and demonstrates understanding of this. She can F/U with evaluation for her cervical spine but discussed likely need for referral and authorization for visits secondary to insurance. Recommending discharge from this episode of care for her shoulder.     Plan  No further visits planned at this time.     Goals  Independent with HEP to expedite progress and promote goal achievement - partially met   Decrease DASH to < or equal to 20% disabled for increased functional ability - met  Increase AROM left shoulder flexion and abduction to > or equal to 150 degrees, extension to > or equal to 50 degrees, ER @ 90 degrees abduction to > or equal to 70 degrees, and HBB to > or equal to L1 for ease with reaching up and out to the side - partially met  Increase strength left shoulder deltoid and RC to 5/5 without pain for ease with lifting and stabilizing with left UE - partially met  Decrease pain at worst with use of left UE to < or equal to 2/10 for improved QOL and confidence in functional ability - met    "

## 2025-04-28 ENCOUNTER — APPOINTMENT (OUTPATIENT)
Dept: PHYSICAL THERAPY | Facility: CLINIC | Age: 65
End: 2025-04-28
Payer: COMMERCIAL

## 2025-04-28 DIAGNOSIS — M25.512 LEFT SHOULDER PAIN, UNSPECIFIED CHRONICITY: ICD-10-CM

## 2025-04-28 DIAGNOSIS — M75.02 ADHESIVE CAPSULITIS OF LEFT SHOULDER: Primary | ICD-10-CM

## 2025-04-28 DIAGNOSIS — M25.612 SHOULDER STIFFNESS, LEFT: ICD-10-CM

## 2025-04-28 PROCEDURE — 97140 MANUAL THERAPY 1/> REGIONS: CPT | Performed by: PHYSICAL THERAPIST

## 2025-04-28 PROCEDURE — 97110 THERAPEUTIC EXERCISES: CPT | Performed by: PHYSICAL THERAPIST

## 2025-04-28 ASSESSMENT — PAIN SCALES - GENERAL: PAINLEVEL_OUTOF10: 0 - NO PAIN

## 2025-05-04 ENCOUNTER — HOSPITAL ENCOUNTER (EMERGENCY)
Facility: HOSPITAL | Age: 65
Discharge: HOME | End: 2025-05-04
Payer: COMMERCIAL

## 2025-05-04 ENCOUNTER — APPOINTMENT (OUTPATIENT)
Dept: RADIOLOGY | Facility: HOSPITAL | Age: 65
End: 2025-05-04
Payer: COMMERCIAL

## 2025-05-04 VITALS
HEART RATE: 82 BPM | BODY MASS INDEX: 27.47 KG/M2 | DIASTOLIC BLOOD PRESSURE: 61 MMHG | OXYGEN SATURATION: 95 % | WEIGHT: 175 LBS | SYSTOLIC BLOOD PRESSURE: 106 MMHG | HEIGHT: 67 IN | RESPIRATION RATE: 16 BRPM | TEMPERATURE: 96.8 F

## 2025-05-04 DIAGNOSIS — S39.012A LUMBOSACRAL STRAIN, INITIAL ENCOUNTER: Primary | ICD-10-CM

## 2025-05-04 LAB
APPEARANCE UR: CLEAR
BILIRUB UR STRIP.AUTO-MCNC: NEGATIVE MG/DL
COLOR UR: YELLOW
GLUCOSE UR STRIP.AUTO-MCNC: NORMAL MG/DL
HOLD SPECIMEN: NORMAL
KETONES UR STRIP.AUTO-MCNC: NEGATIVE MG/DL
LEUKOCYTE ESTERASE UR QL STRIP.AUTO: NEGATIVE
NITRITE UR QL STRIP.AUTO: NEGATIVE
PH UR STRIP.AUTO: 5.5 [PH]
PROT UR STRIP.AUTO-MCNC: NEGATIVE MG/DL
RBC # UR STRIP.AUTO: NEGATIVE MG/DL
SP GR UR STRIP.AUTO: 1.03
UROBILINOGEN UR STRIP.AUTO-MCNC: NORMAL MG/DL

## 2025-05-04 PROCEDURE — 72131 CT LUMBAR SPINE W/O DYE: CPT | Performed by: RADIOLOGY

## 2025-05-04 PROCEDURE — 2500000001 HC RX 250 WO HCPCS SELF ADMINISTERED DRUGS (ALT 637 FOR MEDICARE OP): Performed by: PHYSICIAN ASSISTANT

## 2025-05-04 PROCEDURE — 81003 URINALYSIS AUTO W/O SCOPE: CPT | Performed by: PHYSICIAN ASSISTANT

## 2025-05-04 PROCEDURE — 2500000004 HC RX 250 GENERAL PHARMACY W/ HCPCS (ALT 636 FOR OP/ED): Performed by: PHYSICIAN ASSISTANT

## 2025-05-04 PROCEDURE — 72131 CT LUMBAR SPINE W/O DYE: CPT

## 2025-05-04 PROCEDURE — 99284 EMERGENCY DEPT VISIT MOD MDM: CPT

## 2025-05-04 RX ORDER — OXYCODONE AND ACETAMINOPHEN 5; 325 MG/1; MG/1
1 TABLET ORAL ONCE
Refills: 0 | Status: COMPLETED | OUTPATIENT
Start: 2025-05-04 | End: 2025-05-04

## 2025-05-04 RX ORDER — NAPROXEN SODIUM 550 MG/1
550 TABLET ORAL
Qty: 20 TABLET | Refills: 0 | Status: SHIPPED | OUTPATIENT
Start: 2025-05-04 | End: 2025-05-14

## 2025-05-04 RX ORDER — ONDANSETRON 4 MG/1
4 TABLET, ORALLY DISINTEGRATING ORAL ONCE
Status: COMPLETED | OUTPATIENT
Start: 2025-05-04 | End: 2025-05-04

## 2025-05-04 RX ORDER — PREDNISONE 20 MG/1
60 TABLET ORAL ONCE
Status: COMPLETED | OUTPATIENT
Start: 2025-05-04 | End: 2025-05-04

## 2025-05-04 RX ORDER — PREDNISONE 50 MG/1
50 TABLET ORAL DAILY
Qty: 5 TABLET | Refills: 0 | Status: SHIPPED | OUTPATIENT
Start: 2025-05-04 | End: 2025-05-09

## 2025-05-04 RX ORDER — CYCLOBENZAPRINE HCL 10 MG
10 TABLET ORAL 3 TIMES DAILY PRN
Qty: 15 TABLET | Refills: 0 | Status: SHIPPED | OUTPATIENT
Start: 2025-05-04 | End: 2025-05-09

## 2025-05-04 RX ADMIN — ONDANSETRON 4 MG: 4 TABLET, ORALLY DISINTEGRATING ORAL at 08:34

## 2025-05-04 RX ADMIN — OXYCODONE HYDROCHLORIDE AND ACETAMINOPHEN 1 TABLET: 5; 325 TABLET ORAL at 08:33

## 2025-05-04 RX ADMIN — PREDNISONE 60 MG: 20 TABLET ORAL at 08:34

## 2025-05-04 ASSESSMENT — COLUMBIA-SUICIDE SEVERITY RATING SCALE - C-SSRS
1. IN THE PAST MONTH, HAVE YOU WISHED YOU WERE DEAD OR WISHED YOU COULD GO TO SLEEP AND NOT WAKE UP?: NO
6. HAVE YOU EVER DONE ANYTHING, STARTED TO DO ANYTHING, OR PREPARED TO DO ANYTHING TO END YOUR LIFE?: NO
2. HAVE YOU ACTUALLY HAD ANY THOUGHTS OF KILLING YOURSELF?: NO

## 2025-05-04 ASSESSMENT — PAIN DESCRIPTION - DESCRIPTORS: DESCRIPTORS: ACHING

## 2025-05-04 ASSESSMENT — PAIN SCALES - GENERAL
PAINLEVEL_OUTOF10: 4
PAINLEVEL_OUTOF10: 6
PAINLEVEL_OUTOF10: 6

## 2025-05-04 ASSESSMENT — LIFESTYLE VARIABLES
HAVE YOU EVER FELT YOU SHOULD CUT DOWN ON YOUR DRINKING: NO
TOTAL SCORE: 0
HAVE PEOPLE ANNOYED YOU BY CRITICIZING YOUR DRINKING: NO
EVER HAD A DRINK FIRST THING IN THE MORNING TO STEADY YOUR NERVES TO GET RID OF A HANGOVER: NO
EVER FELT BAD OR GUILTY ABOUT YOUR DRINKING: NO

## 2025-05-04 ASSESSMENT — PAIN DESCRIPTION - LOCATION: LOCATION: BACK

## 2025-05-04 ASSESSMENT — PAIN - FUNCTIONAL ASSESSMENT
PAIN_FUNCTIONAL_ASSESSMENT: 0-10
PAIN_FUNCTIONAL_ASSESSMENT: 0-10

## 2025-05-04 NOTE — ED PROVIDER NOTES
HPI   Chief Complaint   Patient presents with    Back Pain       CT lumbar spine ordered to rule out any acute bony abnormality, urinalysis ordered to rule out infection,              Patient History   Medical History[1]  Surgical History[2]  Family History[3]  Social History[4]    Physical Exam   ED Triage Vitals [05/04/25 0802]   Temperature Heart Rate Respirations BP   36 °C (96.8 °F) 82 18 124/63      Pulse Ox Temp src Heart Rate Source Patient Position   97 % -- -- --      BP Location FiO2 (%)     -- --       Physical Exam  Constitutional:       General: She is in acute distress.      Appearance: Normal appearance. She is not ill-appearing or diaphoretic.   HENT:      Head: Normocephalic and atraumatic.      Nose: Nose normal.   Eyes:      Extraocular Movements: Extraocular movements intact.      Conjunctiva/sclera: Conjunctivae normal.      Pupils: Pupils are equal, round, and reactive to light.   Cardiovascular:      Rate and Rhythm: Normal rate and regular rhythm.   Pulmonary:      Effort: Pulmonary effort is normal. No respiratory distress.      Breath sounds: Normal breath sounds. No stridor. No wheezing.   Abdominal:      Tenderness: There is no right CVA tenderness or left CVA tenderness.   Musculoskeletal:         General: Tenderness (Midline lumbar spine, SI joints, paraspinal musculature) present. Normal range of motion.      Cervical back: Normal range of motion.   Skin:     General: Skin is warm and dry.   Neurological:      General: No focal deficit present.      Mental Status: She is alert and oriented to person, place, and time. Mental status is at baseline.   Psychiatric:         Mood and Affect: Mood normal.           ED Course & MDM   Diagnoses as of 05/04/25 1016   Lumbosacral strain, initial encounter                 No data recorded     Kayla Coma Scale Score: 15 (05/04/25 0817 : Jerilyn Lares RN)                           Medical Decision Making  A 64-year-old female patient comes in  the emergency department today with complaints of low back pain has been ongoing for the last 5 days.  States this started when she was bending over.  She states that now that she has difficulty getting from a laying to a standing position or from a sitting position to a lying position this points most uncomfortable.  She rates the pain currently a 6 out of 10 on the pain scale.  Described as sharp.  Denies any associated saddle anesthesia, loss of bowel control or urinary retention.  Denies any urinary dysuria frequency or urgency.  This per but she comes to the emergency department today for the evaluation.    P.o. Percocet, Zofran, prednisone ordered for the patient's pain    Patient having some relief with medication.  Patient CT study is negative for any acute findings.  Will discharge patient home.  Patient states she is going to follow-up with her chiropractor.  Will prescribe some medications.  Patient agrees with this plan expressed verbal understanding precautions were answered.    Historians patient    Diagnosis: Lumbosacral strain      Labs Reviewed   URINALYSIS WITH REFLEX CULTURE AND MICROSCOPIC    Narrative:     The following orders were created for panel order Urinalysis with Reflex Culture and Microscopic.  Procedure                               Abnormality         Status                     ---------                               -----------         ------                     Urinalysis with Reflex C...[[id number]]                                                 Extra Urine Gray Tube[[id number]]                                                         Please view results for these tests on the individual orders.   URINALYSIS WITH REFLEX CULTURE AND MICROSCOPIC   EXTRA URINE GRAY TUBE        CT lumbar spine wo IV contrast   Final Result   NO ACUTE FRACTURE OR SUBLUXATION IN THE LUMBAR SPINE        MACRO:   None        Signed by: Suraj Angel 5/4/2025 9:11 AM   Dictation workstation:   [id number]           Procedure  Procedures         [1]   Past Medical History:  Diagnosis Date    Personal history of other endocrine, nutritional and metabolic disease     History of hypercholesterolemia    Supraventricular tachycardia     Sustained SVT   [2]   Past Surgical History:  Procedure Laterality Date    ABLATION OF DYSRHYTHMIC FOCUS  04/04/2022    Catheter Ablation    DILATION AND CURETTAGE OF UTERUS  12/05/2015    Dilation And Curettage    LITHOTRIPSY  08/15/2016    Renal Lithotripsy    OTHER SURGICAL HISTORY  04/04/2022    Complete colonoscopy    OTHER SURGICAL HISTORY  04/04/2022    Renal lithotripsy    SHOULDER SURGERY  08/16/2016    Shoulder Surgery   [3]   Family History  Problem Relation Name Age of Onset    Hypertension Mother      Hyperlipidemia Mother      Other (age related macular degeneration) Mother      Cataracts Mother      Cataracts Father      COPD Father      Glaucoma Father      Leukemia Brother      Diabetes Other grandmother     Lymphoma Other grandmother    [4]   Social History  Tobacco Use    Smoking status: Never    Smokeless tobacco: Never   Substance Use Topics    Alcohol use: Yes     Comment: rare    Drug use: Never        Kale Mack PA-C  05/04/25 1016

## 2025-05-04 NOTE — ED TRIAGE NOTES
"Pt to ED for c/o back pain.  Pt states she felt a \"slip\" at work Wednesday and has had limited movement and pain since. Respirations even and unlabored.  No acute distress noted at this time.  Denies CP and SOB.  A&Ox4.  VSS.    "

## 2025-07-03 ENCOUNTER — HOSPITAL ENCOUNTER (OUTPATIENT)
Dept: RADIOLOGY | Facility: HOSPITAL | Age: 65
Discharge: HOME | End: 2025-07-03
Payer: COMMERCIAL

## 2025-07-03 DIAGNOSIS — N28.1 RENAL CYST: ICD-10-CM

## 2025-07-03 DIAGNOSIS — N20.0 NEPHROLITHIASIS: ICD-10-CM

## 2025-07-03 PROCEDURE — 76770 US EXAM ABDO BACK WALL COMP: CPT

## 2025-07-16 ENCOUNTER — PHARMACY VISIT (OUTPATIENT)
Dept: PHARMACY | Facility: CLINIC | Age: 65
End: 2025-07-16
Payer: COMMERCIAL

## 2025-07-16 ENCOUNTER — TELEMEDICINE (OUTPATIENT)
Dept: PRIMARY CARE | Facility: CLINIC | Age: 65
End: 2025-07-16
Payer: COMMERCIAL

## 2025-07-16 DIAGNOSIS — M25.551 RIGHT HIP PAIN: Primary | ICD-10-CM

## 2025-07-16 PROCEDURE — 1036F TOBACCO NON-USER: CPT | Performed by: NURSE PRACTITIONER

## 2025-07-16 PROCEDURE — 99214 OFFICE O/P EST MOD 30 MIN: CPT | Performed by: NURSE PRACTITIONER

## 2025-07-16 PROCEDURE — RXMED WILLOW AMBULATORY MEDICATION CHARGE

## 2025-07-16 RX ORDER — NAPROXEN 500 MG/1
500 TABLET ORAL 2 TIMES DAILY PRN
Qty: 20 TABLET | Refills: 0 | Status: SHIPPED | OUTPATIENT
Start: 2025-07-16 | End: 2025-07-26

## 2025-07-16 RX ORDER — PREDNISONE 10 MG/1
TABLET ORAL
Qty: 42 TABLET | Refills: 0 | Status: SHIPPED | OUTPATIENT
Start: 2025-07-16 | End: 2025-07-28

## 2025-07-16 RX ORDER — TIZANIDINE HYDROCHLORIDE 2 MG/1
2 CAPSULE, GELATIN COATED ORAL 2 TIMES DAILY PRN
Qty: 20 CAPSULE | Refills: 0 | Status: SHIPPED | OUTPATIENT
Start: 2025-07-16

## 2025-07-16 ASSESSMENT — ENCOUNTER SYMPTOMS: BACK PAIN: 1

## 2025-07-16 NOTE — ASSESSMENT & PLAN NOTE
- naproxen 500 mg by mouth twice a day ×10 days - dp no  - prednisone  - tizanidine - discussed may make drowsy - do not drive when taking  -  ice to area 20 minutes 2-3 times a day  - red flags discussed including worsening symptoms, inability to urinate, or new symptoms  - follow up with pcp in 3-5 days if no improvement

## 2025-07-16 NOTE — PROGRESS NOTES
Subjective   Patient ID: Salina Gordon is a 64 y.o. female who presents for Back Pain.    Virtual or Telephone Consent    An interactive audio and video telecommunication system which permits real time communications between the patient (at the originating site) and provider (at the distant site) was utilized to provide this telehealth service.   Verbal consent was requested and obtained from Salina Gordon on this date, 07/16/25 for a telehealth visit and the patient's location was confirmed at the time of the visit.  Patient is located in [place]  DISCLAIMER:   In preparing for this visit and writing this note, I reviewed previous electronic medical records (labs, imaging and medical charts) of the patient available in the physician portal. Significant findings which helped in decision making are recorded in this encounter charting.  All allergies were reviewed with the patient and all medications reconciled with   the patient.    Back pain - worse to right side and will radiate to right thigh.  Pain worsens with changing positions from siting to standing.  Pain is constant and described as aching and grabbing.  Pain started 3 days ago.  She has had back pain like this prior.  She has tried stretches and ibuprofen with minimal relief  She works as RT  No change in bowel or bladder habits  She states pain is more in her hip than her back during her visit  Pain will worsen if she lays on that side in bed            Back Pain         Review of Systems   Musculoskeletal:  Positive for back pain.   All other systems reviewed and are negative.      Objective   There were no vitals taken for this visit.    Physical Exam  Constitutional:       General: She is not in acute distress.     Appearance: Normal appearance.   HENT:      Head: Normocephalic and atraumatic.      Right Ear: External ear normal.      Left Ear: External ear normal.      Nose: Nose normal.      Mouth/Throat:      Mouth: Mucous membranes are moist.      Eyes:      Extraocular Movements: Extraocular movements intact.      Conjunctiva/sclera: Conjunctivae normal.      Pupils: Pupils are equal, round, and reactive to light.     Pulmonary:      Effort: Pulmonary effort is normal.     Neurological:      Mental Status: She is alert and oriented to person, place, and time.     Psychiatric:         Mood and Affect: Mood normal.         Behavior: Behavior normal.         Thought Content: Thought content normal.         Judgment: Judgment normal.         Assessment/Plan   Problem List Items Addressed This Visit           ICD-10-CM    Right hip pain - Primary M25.551    - naproxen 500 mg by mouth twice a day ×10 days - dp no  - prednisone  - tizanidine - discussed may make drowsy - do not drive when taking  -  ice to area 20 minutes 2-3 times a day  - red flags discussed including worsening symptoms, inability to urinate, or new symptoms  - follow up with pcp in 3-5 days if no improvement

## 2025-07-21 ENCOUNTER — OFFICE VISIT (OUTPATIENT)
Dept: ORTHOPEDIC SURGERY | Facility: CLINIC | Age: 65
End: 2025-07-21
Payer: COMMERCIAL

## 2025-07-21 ENCOUNTER — HOSPITAL ENCOUNTER (OUTPATIENT)
Dept: RADIOLOGY | Facility: CLINIC | Age: 65
Discharge: HOME | End: 2025-07-21
Payer: COMMERCIAL

## 2025-07-21 DIAGNOSIS — M25.551 PAIN OF RIGHT HIP: ICD-10-CM

## 2025-07-21 DIAGNOSIS — M87.00 AVN (AVASCULAR NECROSIS OF BONE) (MULTI): ICD-10-CM

## 2025-07-21 DIAGNOSIS — M54.16 LUMBAR RADICULOPATHY: ICD-10-CM

## 2025-07-21 PROCEDURE — RXMED WILLOW AMBULATORY MEDICATION CHARGE

## 2025-07-21 PROCEDURE — 99214 OFFICE O/P EST MOD 30 MIN: CPT | Performed by: INTERNAL MEDICINE

## 2025-07-21 PROCEDURE — 73502 X-RAY EXAM HIP UNI 2-3 VIEWS: CPT | Mod: RIGHT SIDE | Performed by: INTERNAL MEDICINE

## 2025-07-21 PROCEDURE — 99212 OFFICE O/P EST SF 10 MIN: CPT | Performed by: INTERNAL MEDICINE

## 2025-07-21 PROCEDURE — 73502 X-RAY EXAM HIP UNI 2-3 VIEWS: CPT | Mod: RT

## 2025-07-21 RX ORDER — HYDROCODONE BITARTRATE AND ACETAMINOPHEN 5; 325 MG/1; MG/1
1 TABLET ORAL EVERY 12 HOURS PRN
Qty: 14 TABLET | Refills: 0 | Status: SHIPPED | OUTPATIENT
Start: 2025-07-21 | End: 2025-07-31

## 2025-07-21 NOTE — PROGRESS NOTES
Acute Injury New Patient Visit    CC:   Chief Complaint   Patient presents with    Right Hip - Pain       HPI: Salina is a 64 y.o. female presents today for evaluation for acute right hip and low back pain which started about two weeks ago. No trauma or fall.  She states that she was prescribed oral steroids and a muscle relaxant during a virtual visit. She states that she had no relief from that. She is here for initial evaluation and x-rays.  She states had a previous CT scan of her back several weeks ago.  Now complains of increased pain in her right hip which radiates to her groin and to her right lower leg.  She works as a respiratory therapist.  Denies any stool or urine incontinence.        Review of Systems   GENERAL: Negative for malaise, significant weight loss, fever  MUSCULOSKELETAL: See HPI  NEURO:  Negative for numbness / tingling     Past Medical History  Medical History[1]    Medication review  Medication Documentation Review Audit       Reviewed by ABDULLAHI Dominguez (Nurse Practitioner) on 07/16/25 at 1542      Medication Order Taking? Sig Documenting Provider Last Dose Status   acyclovir (Zovirax) 400 mg tablet [id number] No Take 1 tablet (400 mg) by mouth 3 times a day as needed (Cold sores.). Alex Ha MD Taking Active   calcium carbonate-vitamin D3 1,000 mg-20 mcg (800 unit) tablet [id number] No Take 1 tablet by mouth once daily. Historical Provider, MD Taking Active   cholecalciferol (Vitamin D-3) 25 MCG (1000 UT) tablet [id number] No Take 1 tablet (25 mcg) by mouth once daily. Historical Provider, MD Taking Active     Discontinued 07/16/25 1542   fexofenadine (Allegra) 180 mg tablet [id number] No Take 1 tablet (180 mg) by mouth once daily in the evening. Take with meals.   Patient taking differently: Take 1 tablet (180 mg) by mouth once daily as needed.    Alex Ha MD Taking Differently Active   fluoride, sodium, (Prevident 5000 Plus) 1.1 % dental  cream [id number]  Brush teeth 2 times a day.   Active   magnesium oxide (Mag-Ox) 400 mg (241.3 mg magnesium) tablet [id number]  Take 1 tablet (400 mg) by mouth once daily. Marino Brown, DO  Active   meloxicam (Mobic) 15 mg tablet [id number]  Please take 1 tablet by mouth with food every day as needed for pain.  Thank you.  Lots of fluids please throughout the day. Alex Ha MD  Active   omeprazole OTC (PriLOSEC OTC) 20 mg EC tablet [id number] No Take 1 tablet (20 mg) by mouth once daily. Historical Provider, MD Taking Active   rosuvastatin (Crestor) 20 mg tablet [id number]  Take 1 tablet (20 mg) by mouth once daily. Marino Brown DO  Active   triamcinolone (Kenalog) 0.1 % ointment [id number] No Apply thin layer topically to affected area(s) every 8 hours. Do not cover with bandage. Alex Ha MD Taking Differently Active                    Allergies  RX Allergies[2]    Social History  Social History     Socioeconomic History    Marital status:      Spouse name: Not on file    Number of children: Not on file    Years of education: Not on file    Highest education level: Not on file   Occupational History    Not on file   Tobacco Use    Smoking status: Never    Smokeless tobacco: Never   Substance and Sexual Activity    Alcohol use: Yes     Comment: rare    Drug use: Never    Sexual activity: Defer   Other Topics Concern    Not on file   Social History Narrative    Not on file     Social Drivers of Health     Financial Resource Strain: Not on file   Food Insecurity: Not on file   Transportation Needs: Not on file   Physical Activity: Not on file   Stress: Not on file   Social Connections: Not on file   Intimate Partner Violence: Not on file   Housing Stability: Not on file       Surgical History  Surgical History[3]    Physical Exam:  GENERAL:  Patient is awake, alert, and oriented to person place and time.  Patient appears well nourished and well kept.  Affect Calm, Not Acutely  Distressed.  HEENT:  Normocephalic, Atraumatic, EOMI  CARDIOVASCULAR:  Hemodynamically stable.  RESPIRATORY:  Normal respirations with unlabored breathing.  Extremity: Lumbar spine examination shows skin is intact.  She has pain with forward flexion and reverse extension.  Mild pain with left and right lateral rotation.  There is no midline lumbar tenderness.  Mild pain of the SI joints.  Quadricep strength is 5/5 on the left, compared to 4/5 on the right.  She is able to stand on tiptoes and heels with no difficulties.  Positive straight leg test on the right leg.  Positive straight leg Left Leg.    Right hip shows skin is intact.  She can flex right hip at 90 degrees.  There is no pain with internal or external rotation.  Minimal pain of the right trochanteric bursa.  She is able to get up from a chair with no difficulties.  She is neurovascular intact.      Diagnostics: X-rays reviewed      Procedure: None    Assessment:   Right sided lumbar radiculopathy  Right hip pain possible stress fracture    Plan: Salina presents today for evaluation for acute right hip pain and low back pain which initially occurred several weeks ago and reoccurred over the weekend.  Does have right sided lumbar radicular symptoms, along with severe right hip pain and groin pain.  Recommended an MRI of the lumbar spine to evaluate for right sided lumbar radiculopathy.  We also recommend MRI of the right hip to rule out underlying right hip stress fracture or AVN.  She will continue the oral steroids.  We did refill her pain medication.  Should be off of work for at least 2 weeks or until MRI follow-up.  She will follow-up with the spine team after the MRI of the lumbar spine.      Orders Placed This Encounter    XR hip right with pelvis when performed 2 or 3 views      At the conclusion of the visit there were no further questions by the patient/family regarding their plan of care.  Patient was instructed to call or return with any  issues, questions, or concerns regarding their injury and/or treatment plan described above.     07/21/25 at 1:01 PM - Gilberto Cotto MD  Scribe Attestation  By signing my name below, Carlos COTTO Scribe   attest that this documentation has been prepared under the direction and in the presence of Gilberto Cotto MD.    Office: [phone]    We already utilize the scribe attestation, ICarlos, am scribing for, and in the presence of Dr. Cotto.    IGilberto MD personally performed the services described in the documentation as scribed by Carlos Oliveros in my presence, and confirm it is both accurate and complete.    This note was prepared using voice recognition software.  The details of this note are correct and have been reviewed, and corrected to the best of my ability.  Some grammatical errors may persist related to the Dragon software.         [1]   Past Medical History:  Diagnosis Date    Arthritis     CTS (carpal tunnel syndrome) 2015    Frozen shoulder     Neuroma of foot     Personal history of other endocrine, nutritional and metabolic disease     History of hypercholesterolemia    Rotator cuff syndrome     Supraventricular tachycardia     Sustained SVT   [2] No Known Allergies  [3]   Past Surgical History:  Procedure Laterality Date    ABLATION OF DYSRHYTHMIC FOCUS  04/04/2022    Catheter Ablation    DILATION AND CURETTAGE OF UTERUS  12/05/2015    Dilation And Curettage    LITHOTRIPSY  08/15/2016    Renal Lithotripsy    OTHER SURGICAL HISTORY  04/04/2022    Complete colonoscopy    OTHER SURGICAL HISTORY  04/04/2022    Renal lithotripsy    SHOULDER SURGERY  08/16/2016    Shoulder Surgery

## 2025-07-21 NOTE — LETTER
July 21, 2025     Patient: Salina Gordon   YOB: 1960   Date of Visit: 7/21/2025       To Whom It May Concern:    Salina Gordon was seen in my clinic on 7/21/2025 at 1:00 pm. Please excuse Salina for her absence from work on this day to make the appointment. Salina will remain out of work for 2 weeks.    If you have any questions or concerns, please don't hesitate to call.      Sincerely,     Gilberto Cotto MD  Electronically Signed

## 2025-07-23 ENCOUNTER — APPOINTMENT (OUTPATIENT)
Dept: ORTHOPEDIC SURGERY | Facility: CLINIC | Age: 65
End: 2025-07-23
Payer: COMMERCIAL

## 2025-07-24 ENCOUNTER — PHARMACY VISIT (OUTPATIENT)
Dept: PHARMACY | Facility: CLINIC | Age: 65
End: 2025-07-24
Payer: COMMERCIAL

## 2025-07-27 ENCOUNTER — HOSPITAL ENCOUNTER (OUTPATIENT)
Dept: RADIOLOGY | Facility: HOSPITAL | Age: 65
Discharge: HOME | End: 2025-07-27
Payer: COMMERCIAL

## 2025-07-27 DIAGNOSIS — M54.16 LUMBAR RADICULOPATHY: ICD-10-CM

## 2025-07-27 DIAGNOSIS — M25.551 PAIN OF RIGHT HIP: ICD-10-CM

## 2025-07-27 DIAGNOSIS — M87.00 AVN (AVASCULAR NECROSIS OF BONE) (MULTI): ICD-10-CM

## 2025-07-27 PROCEDURE — 73721 MRI JNT OF LWR EXTRE W/O DYE: CPT | Mod: RT

## 2025-07-27 PROCEDURE — 72148 MRI LUMBAR SPINE W/O DYE: CPT

## 2025-07-27 PROCEDURE — 72148 MRI LUMBAR SPINE W/O DYE: CPT | Performed by: RADIOLOGY

## 2025-07-28 ENCOUNTER — OFFICE VISIT (OUTPATIENT)
Dept: ORTHOPEDIC SURGERY | Facility: CLINIC | Age: 65
End: 2025-07-28
Payer: COMMERCIAL

## 2025-07-28 ENCOUNTER — HOSPITAL ENCOUNTER (OUTPATIENT)
Dept: RADIOLOGY | Facility: CLINIC | Age: 65
Discharge: HOME | End: 2025-07-28
Payer: COMMERCIAL

## 2025-07-28 DIAGNOSIS — M54.16 LUMBAR RADICULOPATHY: ICD-10-CM

## 2025-07-28 DIAGNOSIS — M25.551 PAIN OF RIGHT HIP: Primary | ICD-10-CM

## 2025-07-28 PROCEDURE — 72110 X-RAY EXAM L-2 SPINE 4/>VWS: CPT | Performed by: ORTHOPAEDIC SURGERY

## 2025-07-28 PROCEDURE — 99213 OFFICE O/P EST LOW 20 MIN: CPT | Performed by: PHYSICIAN ASSISTANT

## 2025-07-28 PROCEDURE — 72120 X-RAY BEND ONLY L-S SPINE: CPT

## 2025-07-28 PROCEDURE — 1036F TOBACCO NON-USER: CPT | Performed by: PHYSICIAN ASSISTANT

## 2025-07-28 NOTE — PROGRESS NOTES
Salina Gordon is a 64 y.o. female who presents for Pain of the Lower Back (NP ref by HS/MRI and CT at /Xrays today).    HPI:  64-year-old female here for new patient evaluation of low back pain.  Sent by Dr. Cotto.  Has MRI and CT.  X-rays were taken today.  She denies any fever chills nausea vomiting night sweats.  She has no bowel or bladder complaints.    Physical exam:  Well-nourished, well kept.No lymphangitis or lymphadenopathy in the examined extremities.  Gait normal.  Can stand on heels and toes.   Examination of the back shows no significant tenderness in the paraspinous musculature.  There is no significant decreased range of motion in all directions due to guarding/muscle spasms and pain at extremes.  There is good strength and no instability.  Examination of the lower extremities reveals no point tenderness, swelling, or deformity.  Range of motion of the hips, knees, and ankles are full without crepitance, instability, or exacerbation of pain, except she is mildly tender over her right greater trochanteric bursa.  Strength is 5/5 throughout.  No redness, abrasions, or lesions on extremities  Gross sensation intact in the extremities.  Affect normal.  Alert and oriented ×3.  Coordination normal.    Imaging studies:  We have ordered and reviewed AP lateral flexion-extension plain films of the lumbar spine today.  We reviewed an MRI of the right hip from July 27, 2025 we reviewed an MRI of the lumbar spine from July 27, 2025.  X-rays of the right hip from July 21, 2025 were reviewed.    Assessment:  64-year-old female here for new patient evaluation of mostly right hip and groin pain and a little calf and ankle pain on the right.  This has been going on for about a month.  She was originally seen by Dr. Cotto.  She was having some back pain that is greatly she went to a chiropractor in the past that helped, she tried some recent chiropractic  that she did not feel was very good.  Oral steroids may  have helped somewhat as well.  She is not describing any back pain.  Most of her pain is in the lateral of the right hip and groin she might feel little bit of medial inner thigh pain but she is not describing any significant radiation down the leg except a little bit of pain in the calf and she had from time to time.  Her MRI is essentially normal, she does have a large cyst on her right kidney that she knows about and has been monitoring for 10 years.  Her MRI of her right hip shows some mild degenerative changes and possible minimal labral tearing.  I cannot reproduce her hip or groin pain with internal/external rotation however it does not appear to be coming from her back    Plan:  I would like to get her into some physical therapy, something with a manual component and modalities as needed.  I would like to see her a ultrasound-guided right hip injection.  I will see her back in 6 weeks.    I have ordered the tests, x-rays x 2 MRI x 2.  I reviewed the notes from Dr. Cotto from July 21, 2025.  This is an undiagnosed new problem with uncertain prognosis treatment    Hugo Knox PA-C

## 2025-07-28 NOTE — LETTER
July 28, 2025     Patient: Salina Gordon   YOB: 1960   Date of Visit: 7/28/2025       To Whom it May Concern:    Salina Gordon was seen in my clinic on 7/28/2025. She will continue to be off work and can return to work 8/13/25.    If you have any questions or concerns, please don't hesitate to call.         Sincerely,          Hugo Knox PA-C        CC: No Recipients

## 2025-08-05 ENCOUNTER — APPOINTMENT (OUTPATIENT)
Dept: RADIOLOGY | Facility: CLINIC | Age: 65
End: 2025-08-05
Payer: COMMERCIAL

## 2025-08-05 ENCOUNTER — PHARMACY VISIT (OUTPATIENT)
Dept: PHARMACY | Facility: CLINIC | Age: 65
End: 2025-08-05
Payer: COMMERCIAL

## 2025-08-05 ENCOUNTER — APPOINTMENT (OUTPATIENT)
Dept: PRIMARY CARE | Facility: CLINIC | Age: 65
End: 2025-08-05
Payer: COMMERCIAL

## 2025-08-05 VITALS
HEIGHT: 67 IN | BODY MASS INDEX: 27.65 KG/M2 | HEART RATE: 88 BPM | OXYGEN SATURATION: 97 % | SYSTOLIC BLOOD PRESSURE: 115 MMHG | WEIGHT: 176.2 LBS | DIASTOLIC BLOOD PRESSURE: 74 MMHG

## 2025-08-05 DIAGNOSIS — M25.50 POLYARTHRALGIA: ICD-10-CM

## 2025-08-05 DIAGNOSIS — F33.0 MILD RECURRENT MAJOR DEPRESSION: ICD-10-CM

## 2025-08-05 DIAGNOSIS — R73.9 BORDERLINE HYPERGLYCEMIA: ICD-10-CM

## 2025-08-05 DIAGNOSIS — M79.645 CHRONIC PAIN OF LEFT THUMB: ICD-10-CM

## 2025-08-05 DIAGNOSIS — Z12.31 SCREENING MAMMOGRAM FOR BREAST CANCER: ICD-10-CM

## 2025-08-05 DIAGNOSIS — R03.0 BLOOD PRESSURE ELEVATED WITHOUT HISTORY OF HTN: Primary | ICD-10-CM

## 2025-08-05 DIAGNOSIS — E78.2 MIXED HYPERLIPIDEMIA: ICD-10-CM

## 2025-08-05 DIAGNOSIS — Z91.89 FRAMINGHAM CARDIAC RISK <10% IN NEXT 10 YEARS: Chronic | ICD-10-CM

## 2025-08-05 DIAGNOSIS — I47.10 PAROXYSMAL SUPRAVENTRICULAR TACHYCARDIA: ICD-10-CM

## 2025-08-05 DIAGNOSIS — M35.01 SJOGREN SYNDROME WITH KERATOCONJUNCTIVITIS: ICD-10-CM

## 2025-08-05 DIAGNOSIS — M54.41 ACUTE MIDLINE LOW BACK PAIN WITH RIGHT-SIDED SCIATICA: ICD-10-CM

## 2025-08-05 DIAGNOSIS — R20.2 PARESTHESIAS: ICD-10-CM

## 2025-08-05 DIAGNOSIS — M79.10 MYALGIA: ICD-10-CM

## 2025-08-05 DIAGNOSIS — E55.9 VITAMIN D DEFICIENCY: ICD-10-CM

## 2025-08-05 DIAGNOSIS — G89.29 CHRONIC PAIN OF LEFT THUMB: ICD-10-CM

## 2025-08-05 PROBLEM — R55 PRE-SYNCOPE: Status: ACTIVE | Noted: 2025-08-05

## 2025-08-05 PROBLEM — H52.4 PRESBYOPIA: Status: ACTIVE | Noted: 2025-08-05

## 2025-08-05 PROBLEM — B00.1 HERPES LABIALIS: Status: ACTIVE | Noted: 2025-08-05

## 2025-08-05 PROCEDURE — 1036F TOBACCO NON-USER: CPT | Performed by: INTERNAL MEDICINE

## 2025-08-05 PROCEDURE — 3008F BODY MASS INDEX DOCD: CPT | Performed by: INTERNAL MEDICINE

## 2025-08-05 PROCEDURE — RXMED WILLOW AMBULATORY MEDICATION CHARGE

## 2025-08-05 PROCEDURE — 99214 OFFICE O/P EST MOD 30 MIN: CPT | Performed by: INTERNAL MEDICINE

## 2025-08-05 RX ORDER — TETANUS TOXOID, REDUCED DIPHTHERIA TOXOID AND ACELLULAR PERTUSSIS VACCINE, ADSORBED 5; 2.5; 8; 8; 2.5 [IU]/.5ML; [IU]/.5ML; UG/.5ML; UG/.5ML; UG/.5ML
SUSPENSION INTRAMUSCULAR
Qty: 0.5 ML | Refills: 0 | OUTPATIENT
Start: 2025-08-05

## 2025-08-05 ASSESSMENT — PATIENT HEALTH QUESTIONNAIRE - PHQ9
1. LITTLE INTEREST OR PLEASURE IN DOING THINGS: NOT AT ALL
SUM OF ALL RESPONSES TO PHQ9 QUESTIONS 1 AND 2: 1
2. FEELING DOWN, DEPRESSED OR HOPELESS: SEVERAL DAYS

## 2025-08-05 ASSESSMENT — ENCOUNTER SYMPTOMS
LOSS OF SENSATION IN FEET: 0
OCCASIONAL FEELINGS OF UNSTEADINESS: 1
DEPRESSION: 1

## 2025-08-05 NOTE — PROGRESS NOTES
Subjective   Patient ID: Salina Gordon is a 64 y.o. female who presents for Annual Exam.    HPI   The patient is here for reevaluation of hemodynamics, cardiovascular risk.  She does follow with CARDIOLOGY, EP, and has been compliant with medications, tolerating regimens, but lately, because of MYALGIA, she did stop taking her ROSUVASTATIN, and has been off statin for at least 2 months, she states.    She does state some PALPITATIONS lately with straining via PHYSICAL THERAPY, but has learned to limit her physical activity.  No bc substernal chest pain, no orthopnea, no paroxysmal nocturnal dyspnea.  No loss of consciousness.  She is also being followed by ORTHOPEDICS and physical therapy for exacerbation of BACK PAIN, with SCIATICA affecting the right leg.  Some modest ataxia, but has been able to ambulate without need of assistive device.  No falls.  States that she may have strained her back while at work.    Otherwise, no headache, blurred vision, no diplopia, no dysphagia.  Continues to want to improve quality of life, and does not wish harm to self or others.  States that she continues to be dedicated to work, and does not mind being busy, except that over the past 2 weeks, she has been sidelined by her back.    In fact, today, she looks very much relaxed, and looks better than any other time that I have seen her.  She does acknowledge that she has been under less strain, but is looking forward to returning to work in about 1 week.  She will let me know if she needs anything regarding this.    Otherwise, no headache, blurred vision, no diplopia, no dysphagia.  No history of confusion or delirium.  No falls.  Continues to want to improve quality of life, does not wish harm to self or others.    Appetite preserved, no abdominal distress.  No dysuria, flank, suprapubic pain.  No particular skin changes.  No change in bowel or bladder character or habits.  No particular cough or sputum production.  ENDOCRINE  "with no polyuria, polydipsia, polyphagia.  No blurred vision.  No skin, hair, nail changes.  No dramatic weight loss or weight gain.  CONSTITUTIONALLY, no fever, no chills.  No night sweats.  No lingering anorexia or nausea.  No apparent lymphadenopathy.  No apparent weight loss.        Review of Systems  Review of systems as in history of present illness, and otherwise, reviewed separately as well, and was unremarkable/negative/noncontributory.        Objective   /74 (BP Location: Left arm, Patient Position: Sitting, BP Cuff Size: Adult)   Pulse 88   Ht 1.702 m (5' 7\")   Wt 79.9 kg (176 lb 3.2 oz)   SpO2 97%   BMI 27.60 kg/m²     Physical Exam  Looking better than most, with obvious lack of stress!  Not in distress or diaphoresis.  Alert, oriented x 3.  Amiable.  Not unkempt.  Moderately built, not cachectic.  Receptive, cheerful, appropriate, and eager to maintain and hopefully improve quality of life.  Remaining independent, capable, appropriate.    HEAD pink palpebral conjunctivae, anicteric sclerae.  Mucous membranes moist.  NECK supple, no apparent jugular venous distention.  No carotid bruit.  CARDIOVASCULAR not in distress or diaphoresis.  No bipedal edema.  Regular rate and rhythm.  No murmurs appreciated.  LUNGS not in distress or diaphoresis.  Not using accessory muscles.  Clear to auscultation bilaterally.  ABDOMEN soft, nontender.  BACK no costovertebral angle tenderness.  EXTREMITIES no clubbing, no cyanosis.  SKIN with no breakdown, bleeding, jaundice.  NEURO no facial asymmetry.  No apparent cranial nerve deficits.  Romberg manageable, negative.  Minimal ataxia, favoring right leg perhaps, but otherwise, ambulating without need of assistive device.  No tremors.  PSYCH receptive, appropriate, and eager to maintain and improve quality of life.        LABORATORY results from FEBRUARY 2025 reviewed, with hemoglobin A1c 5.6, current body mass index 27.6.  Lipid panel 195/59/triglycerides " 162/LDL cholesterol 108, with current ASCVD risk 3%.  Vitamin D 43.  Urine positive for calcium oxalate and uric acid crystals.  Currently, no urinary symptoms.  History of hydronephrosis noted as well.    MRI of back noted, with arthritis, spondylolisthesis, disc changes, noted.        Assessment/Plan   Diagnoses and all orders for this visit:  Blood pressure elevated without history of HTN  -     Follow Up In [facility]  -     CBC and Auto Differential  -     Comprehensive Metabolic Panel  -     Magnesium  -     Urinalysis with Reflex Microscopic  -     Follow Up In Primary [facility]; Future  Paroxysmal supraventricular tachycardia  -     CBC and Auto Differential  -     Comprehensive Metabolic Panel  -     Magnesium  -     Follow Up In [facility]; Future  Borderline hyperglycemia  -     Comprehensive Metabolic Panel  -     Urinalysis with Reflex Microscopic  -     Hemoglobin A1C  -     Follow Up In [facility]; Future  Mixed hyperlipidemia  -     Comprehensive Metabolic Panel  -     Lipid Panel  -     Follow Up In Primary Care - [facility]; Future  [place] cardiac risk <10% in next 10 years  Comments:  3.1% August 2024  Orders:  -     Comprehensive Metabolic Panel  -     Hemoglobin A1C  -     Lipid Panel  -     Follow Up In Primary [facility]; Future  Vitamin D deficiency  -     Vitamin D 25-Hydroxy,Total (for eval of Vitamin D levels)  -     Follow Up In [facility]; Future  Chronic pain of left thumb  -     Creatine Kinase  -     Uric Acid  -     Follow Up In [facility]; Future  Paresthesias  -     Vitamin B12  -     Folate  -     Follow Up In [facility]; Future  Myalgia  -     Creatine Kinase  -     Uric Acid  -     Follow Up In [facility]; Future  Polyarthralgia  -     Creatine Kinase  -     Uric Acid  -     Follow Up In [facility]; Future  Acute midline low back  pain with right-sided sciatica  -     Follow Up In Primary Care - [facility]; Future  Sjogren syndrome with keratoconjunctivitis  -     Follow Up In Primary Care - Establ[facility]; Future  Mild recurrent major depression  -     Comprehensive Metabolic Panel  -     Follow Up In Primary Care - Establ[facility]; Future  Screening mammogram for breast cancer  -     Follow Up In Primary Care - Established; Future    Thank you very much for coming.  It is always nice to see you!    I am sorry to hear about your BACK.  Please continue following with ORTHOPEDICS, Dr. Reyes, as well as Dr. Cotto.  Please continue doing your PHYSICAL THERAPY as well.    In the meantime, when I do your laboratory examinations, I will also check for irritation of the bone, nerve, muscle, and I will let you know if there is anything that I might be able to contribute to get you back on your feet!    Otherwise, you actually look great!  The time that you are spending taking care of yourself is doing you well.  Please continue with your physical therapy regimens.  Please make sure that you are drinking lots of fluids throughout the day.  Please continue to eat sensibly.  Do not miss meals.  Please make sure that you are getting enough rest and sleep, at least 7 hours.  Do not stay up late unless you really have to.    Please make sure that you are taking a MULTIVITAMIN like Centrum Silver.    Please make sure you get your MAMMOGRAM done soon.  Please get yourself vaccinated TODAY for TETANUS.  Highly recommended, and paid for by your insurance.  Available from your local friendly pharmacy, including the [facility].  Call them up and schedule this please.  This will protect you for 10 years against tetanus, as well as bad sore throat DIPHTHERIA and whooping cough PERTUSSIS.  Again, highly recommended, paid for by insurance, available locally, and protected for 10 years!    After 2 weeks, please get yourself vaccinated for pneumonia with PREVNAR  20.  Again, highly recommended, available from your local friendly pharmacy, paid for by your insurance, and protective for your whole lifetime!    After another 2 years, please consider doing the RSV vaccination.    In late September/early October, please also get yourself vaccinated for INFLUENZA.  I am sure that this will be available at work!    Please continue to take care of yourself.  I want to see you again in 3 months to make sure that you are prepared for winter.  Until then, do not forget to stay in touch with your EYE SPECIALIST.  You should be seeing your optometrist at least every 2 years, and more often if you are having trouble with dry eyes.    Again, thank you very much for coming.  See you in 3 months.  Take care and God bless.    We will do your FASTING laboratory examinations today.  I will send word regarding results and possible changes.            0  Return in 3 months.  20 minutes please.  Reassess hemodynamics, cardiovascular risk.  Coordinate with cardiology, EP.  Reassess mood, energy, function, coordinate with physical therapy, orthopedics.  Coordinate with specialists as patient is seen, neurology, urology, nephrology.  Update donor status.            0

## 2025-08-05 NOTE — PATIENT INSTRUCTIONS
Thank you very much for coming.  It is always nice to see you!    I am sorry to hear about your BACK.  Please continue following with ORTHOPEDICS, Dr. Reyes, as well as Dr. Cotto.  Please continue doing your PHYSICAL THERAPY as well.    In the meantime, when I do your laboratory examinations, I will also check for irritation of the bone, nerve, muscle, and I will let you know if there is anything that I might be able to contribute to get you back on your feet!    Otherwise, you actually look great!  The time that you are spending taking care of yourself is doing you well.  Please continue with your physical therapy regimens.  Please make sure that you are drinking lots of fluids throughout the day.  Please continue to eat sensibly.  Do not miss meals.  Please make sure that you are getting enough rest and sleep, at least 7 hours.  Do not stay up late unless you really have to.    Please make sure that you are taking a MULTIVITAMIN like Centrum Silver.    Please make sure you get your MAMMOGRAM done soon.  Please get yourself vaccinated TODAY for TETANUS.  Highly recommended, and paid for by your insurance.  Available from your local friendly pharmacy, including the [facility].  Call them up and schedule this please.  This will protect you for 10 years against tetanus, as well as bad sore throat DIPHTHERIA and whooping cough PERTUSSIS.  Again, highly recommended, paid for by insurance, available locally, and protected for 10 years!    After 2 weeks, please get yourself vaccinated for pneumonia with PREVNAR 20.  Again, highly recommended, available from your local friendly pharmacy, paid for by your insurance, and protective for your whole lifetime!    After another 2 years, please consider doing the RSV vaccination.    In late September/early October, please also get yourself vaccinated for INFLUENZA.  I am sure that this will be available at work!    Please continue to take care of yourself.  I want to see  you again in 3 months to make sure that you are prepared for winter.  Until then, do not forget to stay in touch with your EYE SPECIALIST.  You should be seeing your optometrist at least every 2 years, and more often if you are having trouble with dry eyes.    Again, thank you very much for coming.  See you in 3 months.  Take care and God bless.    We will do your FASTING laboratory examinations today.  I will send word regarding results and possible changes.            0  Return in 3 months.  20 minutes please.  Reassess hemodynamics, cardiovascular risk.  Coordinate with cardiology, EP.  Reassess mood, energy, function, coordinate with physical therapy, orthopedics.  Coordinate with specialists as patient is seen, neurology, urology, nephrology.  Update donor status.            0

## 2025-08-06 LAB
APPEARANCE UR: CLEAR
BILIRUB UR QL STRIP: NEGATIVE
COLOR UR: YELLOW
GLUCOSE UR QL STRIP: NEGATIVE
HGB UR QL STRIP: NEGATIVE
KETONES UR QL STRIP: NEGATIVE
LEUKOCYTE ESTERASE UR QL STRIP: NEGATIVE
NITRITE UR QL STRIP: NEGATIVE
PH UR STRIP: ABNORMAL [PH] (ref 5–8)
PROT UR QL STRIP: NEGATIVE
SP GR UR STRIP: 1.02 (ref 1–1.03)

## 2025-08-07 LAB
25(OH)D3+25(OH)D2 SERPL-MCNC: 36 NG/ML (ref 30–100)
ALBUMIN SERPL-MCNC: 4.3 G/DL (ref 3.6–5.1)
ALP SERPL-CCNC: 76 U/L (ref 37–153)
ALT SERPL-CCNC: 17 U/L (ref 6–29)
ANION GAP SERPL CALCULATED.4IONS-SCNC: 8 MMOL/L (CALC) (ref 7–17)
AST SERPL-CCNC: 16 U/L (ref 10–35)
BASOPHILS # BLD AUTO: 29 CELLS/UL (ref 0–200)
BASOPHILS NFR BLD AUTO: 0.5 %
BILIRUB SERPL-MCNC: 0.6 MG/DL (ref 0.2–1.2)
BUN SERPL-MCNC: 18 MG/DL (ref 7–25)
CALCIUM SERPL-MCNC: 9.1 MG/DL (ref 8.6–10.4)
CHLORIDE SERPL-SCNC: 104 MMOL/L (ref 98–110)
CHOLEST SERPL-MCNC: 317 MG/DL
CHOLEST/HDLC SERPL: 5.5 (CALC)
CK SERPL-CCNC: 44 U/L (ref 20–243)
CO2 SERPL-SCNC: 27 MMOL/L (ref 20–32)
CREAT SERPL-MCNC: 0.7 MG/DL (ref 0.5–1.05)
EGFRCR SERPLBLD CKD-EPI 2021: 97 ML/MIN/1.73M2
EOSINOPHIL # BLD AUTO: 308 CELLS/UL (ref 15–500)
EOSINOPHIL NFR BLD AUTO: 5.4 %
ERYTHROCYTE [DISTWIDTH] IN BLOOD BY AUTOMATED COUNT: 14.2 % (ref 11–15)
EST. AVERAGE GLUCOSE BLD GHB EST-MCNC: 114 MG/DL
EST. AVERAGE GLUCOSE BLD GHB EST-SCNC: 6.3 MMOL/L
FOLATE SERPL-MCNC: 11.3 NG/ML
GLUCOSE SERPL-MCNC: 90 MG/DL (ref 65–99)
HBA1C MFR BLD: 5.6 %
HCT VFR BLD AUTO: 44.2 % (ref 35–45)
HDLC SERPL-MCNC: 58 MG/DL
HGB BLD-MCNC: 13.8 G/DL (ref 11.7–15.5)
LDLC SERPL CALC-MCNC: 221 MG/DL (CALC)
LYMPHOCYTES # BLD AUTO: 1562 CELLS/UL (ref 850–3900)
LYMPHOCYTES NFR BLD AUTO: 27.4 %
MAGNESIUM SERPL-MCNC: 2.1 MG/DL (ref 1.5–2.5)
MCH RBC QN AUTO: 30.1 PG (ref 27–33)
MCHC RBC AUTO-ENTMCNC: 31.2 G/DL (ref 32–36)
MCV RBC AUTO: 96.5 FL (ref 80–100)
MONOCYTES # BLD AUTO: 439 CELLS/UL (ref 200–950)
MONOCYTES NFR BLD AUTO: 7.7 %
NEUTROPHILS # BLD AUTO: 3363 CELLS/UL (ref 1500–7800)
NEUTROPHILS NFR BLD AUTO: 59 %
NONHDLC SERPL-MCNC: 259 MG/DL (CALC)
PLATELET # BLD AUTO: 278 THOUSAND/UL (ref 140–400)
PMV BLD REES-ECKER: 10.6 FL (ref 7.5–12.5)
POTASSIUM SERPL-SCNC: 4.6 MMOL/L (ref 3.5–5.3)
PROT SERPL-MCNC: 6.5 G/DL (ref 6.1–8.1)
RBC # BLD AUTO: 4.58 MILLION/UL (ref 3.8–5.1)
SODIUM SERPL-SCNC: 139 MMOL/L (ref 135–146)
TRIGL SERPL-MCNC: 203 MG/DL
URATE SERPL-MCNC: 5.1 MG/DL (ref 2.5–7)
VIT B12 SERPL-MCNC: 388 PG/ML (ref 200–1100)
WBC # BLD AUTO: 5.7 THOUSAND/UL (ref 3.8–10.8)

## 2025-08-08 ENCOUNTER — APPOINTMENT (OUTPATIENT)
Dept: ORTHOPEDIC SURGERY | Facility: CLINIC | Age: 65
End: 2025-08-08
Payer: COMMERCIAL

## 2025-08-11 ENCOUNTER — APPOINTMENT (OUTPATIENT)
Dept: ORTHOPEDIC SURGERY | Facility: CLINIC | Age: 65
End: 2025-08-11
Payer: COMMERCIAL

## 2025-08-15 ENCOUNTER — APPOINTMENT (OUTPATIENT)
Dept: UROLOGY | Facility: CLINIC | Age: 65
End: 2025-08-15
Payer: COMMERCIAL

## 2025-08-15 VITALS
BODY MASS INDEX: 28.34 KG/M2 | WEIGHT: 180.56 LBS | HEIGHT: 67 IN | SYSTOLIC BLOOD PRESSURE: 112 MMHG | HEART RATE: 94 BPM | DIASTOLIC BLOOD PRESSURE: 74 MMHG | RESPIRATION RATE: 16 BRPM

## 2025-08-15 DIAGNOSIS — N20.0 NEPHROLITHIASIS: Primary | ICD-10-CM

## 2025-08-15 DIAGNOSIS — N28.1 RENAL CYST: ICD-10-CM

## 2025-08-15 PROCEDURE — 3008F BODY MASS INDEX DOCD: CPT | Performed by: UROLOGY

## 2025-08-15 PROCEDURE — 1036F TOBACCO NON-USER: CPT | Performed by: UROLOGY

## 2025-08-15 PROCEDURE — 99214 OFFICE O/P EST MOD 30 MIN: CPT | Performed by: UROLOGY

## 2025-08-15 ASSESSMENT — ENCOUNTER SYMPTOMS
DYSURIA: 0
DIFFICULTY URINATING: 0
FREQUENCY: 0
HEMATURIA: 0

## 2025-08-26 ENCOUNTER — APPOINTMENT (OUTPATIENT)
Dept: INTEGRATIVE MEDICINE | Facility: CLINIC | Age: 65
End: 2025-08-26
Payer: COMMERCIAL

## 2025-08-26 DIAGNOSIS — G89.29 CHRONIC PRIMARY LOW BACK PAIN: Primary | ICD-10-CM

## 2025-08-26 DIAGNOSIS — M54.2 NECK PAIN: ICD-10-CM

## 2025-08-26 DIAGNOSIS — M54.59 CHRONIC PRIMARY LOW BACK PAIN: Primary | ICD-10-CM

## 2025-08-26 PROCEDURE — 97810 ACUP 1/> WO ESTIM 1ST 15 MIN: CPT

## 2025-08-26 PROCEDURE — 99202 OFFICE O/P NEW SF 15 MIN: CPT

## 2025-08-26 PROCEDURE — 97811 ACUP 1/> W/O ESTIM EA ADD 15: CPT

## 2025-08-26 SDOH — ECONOMIC STABILITY: FOOD INSECURITY: WITHIN THE PAST 12 MONTHS, THE FOOD YOU BOUGHT JUST DIDN'T LAST AND YOU DIDN'T HAVE MONEY TO GET MORE.: NEVER TRUE

## 2025-08-26 SDOH — ECONOMIC STABILITY: FOOD INSECURITY: WITHIN THE PAST 12 MONTHS, YOU WORRIED THAT YOUR FOOD WOULD RUN OUT BEFORE YOU GOT MONEY TO BUY MORE.: NEVER TRUE

## 2025-08-26 SDOH — SOCIAL STABILITY: SOCIAL NETWORK: PROMIS ABILITY TO PARTICIPATE IN SOCIAL ROLES & ACTIVITIES T-SCORE: 50

## 2025-08-26 SDOH — SOCIAL STABILITY: SOCIAL NETWORK: I HAVE TROUBLE DOING ALL OF MY REGULAR LEISURE ACTIVITIES WITH OTHERS: RARELY

## 2025-08-26 SDOH — SOCIAL STABILITY: SOCIAL NETWORK: I HAVE TROUBLE DOING ALL OF THE FAMILY ACTIVITIES THAT I WANT TO DO: RARELY

## 2025-08-26 SDOH — SOCIAL STABILITY: SOCIAL NETWORK

## 2025-08-26 SDOH — SOCIAL STABILITY: SOCIAL NETWORK: I HAVE TROUBLE DOING ALL OF MY USUAL WORK (INCLUDE WORK AT HOME): SOMETIMES

## 2025-08-26 SDOH — SOCIAL STABILITY: SOCIAL NETWORK: I HAVE TROUBLE DOING ALL OF THE ACTIVITIES WITH FRIENDS THAT I WANT TO DO: RARELY

## 2025-08-26 ASSESSMENT — ANXIETY QUESTIONNAIRES
PAST MONTH HOW OFTEN HAVE YOU FELT CONFIDENT ABOUT YOUR ABILITY TO HANDLE YOUR PROBLEMS: 3 - FAIRLY OFTEN
PAST MONTH HOW OFTEN HAVE YOU FELT CONFIDENT ABOUT YOUR ABILITY TO HANDLE YOUR PROBLEMS: 3 - FAIRLY OFTEN
PAST MONTH HOW OFTEN HAVE YOU FELT THAT YOU WERE UNABLE TO CONTROL THE IMPORTANT THINGS IN YOUR LIFE: 2 - SOMETIMES
PAST MONTH HOW OFTEN HAVE YOU FELT DIFFICULTIES WERE PILING UP SO HIGH THAT YOU COULD NOT OVERCOME THEM: 2 - SOMETIMES
PAST MONTH HOW OFTEN HAVE YOU FELT THAT THINGS WERE GOING YOUR WAY: 2 - SOMETIMES
PAST MONTH HOW OFTEN HAVE YOU FELT THAT YOU WERE UNABLE TO CONTROL THE IMPORTANT THINGS IN YOUR LIFE: 2 - SOMETIMES

## 2025-08-26 ASSESSMENT — PROMIS GLOBAL HEALTH SCALE
RATE_PHYSICAL_HEALTH: GOOD
RATE_AVERAGE_PAIN: 3
CARRYOUT_PHYSICAL_ACTIVITIES: MOSTLY
RATE_AVERAGE_FATIGUE: MILD
RATE_GENERAL_HEALTH: GOOD
RATE_SOCIAL_SATISFACTION: GOOD
EMOTIONAL_PROBLEMS: SOMETIMES
CARRYOUT_SOCIAL_ACTIVITIES: GOOD
RATE_QUALITY_OF_LIFE: VERY GOOD
RATE_MENTAL_HEALTH: GOOD

## 2025-09-08 ENCOUNTER — APPOINTMENT (OUTPATIENT)
Dept: ORTHOPEDIC SURGERY | Facility: CLINIC | Age: 65
End: 2025-09-08
Payer: COMMERCIAL

## 2025-09-09 ENCOUNTER — APPOINTMENT (OUTPATIENT)
Dept: INTEGRATIVE MEDICINE | Facility: CLINIC | Age: 65
End: 2025-09-09
Payer: COMMERCIAL

## 2025-09-16 ENCOUNTER — APPOINTMENT (OUTPATIENT)
Dept: INTEGRATIVE MEDICINE | Facility: CLINIC | Age: 65
End: 2025-09-16
Payer: COMMERCIAL

## 2025-09-23 ENCOUNTER — APPOINTMENT (OUTPATIENT)
Dept: INTEGRATIVE MEDICINE | Facility: CLINIC | Age: 65
End: 2025-09-23
Payer: COMMERCIAL

## 2025-09-30 ENCOUNTER — APPOINTMENT (OUTPATIENT)
Dept: INTEGRATIVE MEDICINE | Facility: CLINIC | Age: 65
End: 2025-09-30
Payer: COMMERCIAL

## 2025-11-11 ENCOUNTER — APPOINTMENT (OUTPATIENT)
Dept: PRIMARY CARE | Facility: CLINIC | Age: 65
End: 2025-11-11
Payer: COMMERCIAL

## 2026-03-02 ENCOUNTER — APPOINTMENT (OUTPATIENT)
Dept: CARDIOLOGY | Facility: CLINIC | Age: 66
End: 2026-03-02
Payer: COMMERCIAL

## 2026-04-28 ENCOUNTER — APPOINTMENT (OUTPATIENT)
Dept: CARDIOLOGY | Facility: CLINIC | Age: 66
End: 2026-04-28
Payer: COMMERCIAL

## 2026-08-14 ENCOUNTER — APPOINTMENT (OUTPATIENT)
Dept: UROLOGY | Facility: CLINIC | Age: 66
End: 2026-08-14
Payer: COMMERCIAL